# Patient Record
Sex: MALE | Race: WHITE | NOT HISPANIC OR LATINO | Employment: OTHER | URBAN - METROPOLITAN AREA
[De-identification: names, ages, dates, MRNs, and addresses within clinical notes are randomized per-mention and may not be internally consistent; named-entity substitution may affect disease eponyms.]

---

## 2017-03-30 ENCOUNTER — HISTORICAL (OUTPATIENT)
Dept: LAB | Facility: HOSPITAL | Age: 77
End: 2017-03-30

## 2017-10-16 ENCOUNTER — HISTORICAL (OUTPATIENT)
Dept: LAB | Facility: HOSPITAL | Age: 77
End: 2017-10-16

## 2017-10-16 LAB
ABS NEUT (OLG): 6.1 X10(3)/MCL (ref 2.1–9.2)
ALBUMIN SERPL-MCNC: 3.9 GM/DL (ref 3.4–5)
ALBUMIN/GLOB SERPL: 1.3 RATIO (ref 1.1–2)
ALP SERPL-CCNC: 80 UNIT/L (ref 46–116)
ALT SERPL-CCNC: 14 UNIT/L (ref 12–78)
APPEARANCE, UA: ABNORMAL
AST SERPL-CCNC: 19 UNIT/L (ref 15–37)
BACTERIA SPEC CULT: ABNORMAL
BASOPHILS # BLD AUTO: 0 X10(3)/MCL
BASOPHILS NFR BLD AUTO: 1 % (ref 0–2)
BILIRUB SERPL-MCNC: 0.4 MG/DL (ref 0.2–1)
BILIRUB UR QL STRIP: NEGATIVE
BILIRUBIN DIRECT+TOT PNL SERPL-MCNC: 0.16 MG/DL (ref 0–0.8)
BILIRUBIN DIRECT+TOT PNL SERPL-MCNC: 0.24 MG/DL (ref 0–0.2)
BUN SERPL-MCNC: 20.5 MG/DL (ref 7–18)
CALCIUM SERPL-MCNC: 9 MG/DL (ref 8.5–10.1)
CHLORIDE SERPL-SCNC: 105 MMOL/L (ref 98–107)
CHOLEST SERPL-MCNC: 125 MG/DL (ref 0–200)
CHOLEST/HDLC SERPL: 3.2 {RATIO} (ref 0–5)
CO2 SERPL-SCNC: 31.3 MMOL/L (ref 21–32)
COLOR UR: YELLOW
CREAT SERPL-MCNC: 1.17 MG/DL (ref 0.6–1.3)
EOSINOPHIL # BLD AUTO: 0.1 X10(3)/MCL
EOSINOPHIL NFR BLD AUTO: 2 %
ERYTHROCYTE [DISTWIDTH] IN BLOOD BY AUTOMATED COUNT: 13.6 % (ref 11.5–17)
GLOBULIN SER-MCNC: 3 GM/DL (ref 2.4–3.5)
GLUCOSE (UA): NEGATIVE
GLUCOSE SERPL-MCNC: 100 MG/DL (ref 74–106)
HCT VFR BLD AUTO: 42 % (ref 42–52)
HDLC SERPL-MCNC: 39 MG/DL (ref 40–60)
HGB BLD-MCNC: 14.2 GM/DL (ref 14–18)
HGB UR QL STRIP: ABNORMAL
KETONES UR QL STRIP: NEGATIVE
LDLC SERPL CALC-MCNC: 65 MG/DL (ref 0–129)
LEUKOCYTE ESTERASE UR QL STRIP: NEGATIVE
LYMPHOCYTES # BLD AUTO: 1.1 X10(3)/MCL
LYMPHOCYTES NFR BLD AUTO: 14 % (ref 13–40)
MCH RBC QN AUTO: 30.4 PG (ref 27–31)
MCHC RBC AUTO-ENTMCNC: 33.9 GM/DL (ref 33–36)
MCV RBC AUTO: 89.6 FL (ref 80–94)
MONOCYTES # BLD AUTO: 0.6 X10(3)/MCL
MONOCYTES NFR BLD AUTO: 7 % (ref 2–11)
NEUTROPHILS # BLD AUTO: 6.1 X10(3)/MCL (ref 2.1–9.2)
NEUTROPHILS NFR BLD AUTO: 76 % (ref 47–80)
NITRITE UR QL STRIP: NEGATIVE
PH UR STRIP: 5 [PH] (ref 5–9)
PLATELET # BLD AUTO: 181 X10(3)/MCL (ref 130–400)
PMV BLD AUTO: 7.9 FL (ref 7.4–10.4)
POTASSIUM SERPL-SCNC: 4.3 MMOL/L (ref 3.5–5.1)
PROT SERPL-MCNC: 6.9 GM/DL (ref 6.4–8.2)
PROT UR QL STRIP: NEGATIVE
RBC # BLD AUTO: 4.69 X10(6)/MCL (ref 4.7–6.1)
RBC #/AREA URNS HPF: ABNORMAL /[HPF]
SODIUM SERPL-SCNC: 144 MMOL/L (ref 136–145)
SP GR UR STRIP: 1.02 (ref 1–1.03)
SQUAMOUS EPITHELIAL, UA: ABNORMAL
TRIGL SERPL-MCNC: 105 MG/DL
UROBILINOGEN UR STRIP-ACNC: 0.2
VLDLC SERPL CALC-MCNC: 21 MG/DL
WBC # SPEC AUTO: 8 X10(3)/MCL (ref 4.5–11.5)
WBC #/AREA URNS HPF: ABNORMAL /HPF

## 2017-10-20 ENCOUNTER — HISTORICAL (OUTPATIENT)
Dept: LAB | Facility: HOSPITAL | Age: 77
End: 2017-10-20

## 2017-10-22 LAB — FINAL CULTURE: NO GROWTH

## 2018-02-23 ENCOUNTER — HISTORICAL (OUTPATIENT)
Dept: ADMINISTRATIVE | Facility: HOSPITAL | Age: 78
End: 2018-02-23

## 2018-04-09 ENCOUNTER — HISTORICAL (OUTPATIENT)
Dept: LAB | Facility: HOSPITAL | Age: 78
End: 2018-04-09

## 2018-04-09 LAB
ABS NEUT (OLG): 6.4 X10(3)/MCL (ref 2.1–9.2)
BASOPHILS # BLD AUTO: 0.1 X10(3)/MCL
BASOPHILS NFR BLD AUTO: 1 % (ref 0–2)
BUN SERPL-MCNC: 17.1 MG/DL (ref 7–18)
CALCIUM SERPL-MCNC: 9.2 MG/DL (ref 8.5–10.1)
CHLORIDE SERPL-SCNC: 104 MMOL/L (ref 98–107)
CO2 SERPL-SCNC: 32.9 MMOL/L (ref 21–32)
CREAT SERPL-MCNC: 0.98 MG/DL (ref 0.6–1.3)
CREAT/UREA NIT SERPL: 17
EOSINOPHIL # BLD AUTO: 0.1 X10(3)/MCL
EOSINOPHIL NFR BLD AUTO: 2 %
ERYTHROCYTE [DISTWIDTH] IN BLOOD BY AUTOMATED COUNT: 13.5 % (ref 11.5–17)
GLUCOSE SERPL-MCNC: 97 MG/DL (ref 74–106)
HCT VFR BLD AUTO: 42.3 % (ref 42–52)
HGB BLD-MCNC: 14.3 GM/DL (ref 14–18)
LYMPHOCYTES # BLD AUTO: 1.1 X10(3)/MCL
LYMPHOCYTES NFR BLD AUTO: 13 % (ref 13–40)
MCH RBC QN AUTO: 29.7 PG (ref 27–31)
MCHC RBC AUTO-ENTMCNC: 33.7 GM/DL (ref 33–36)
MCV RBC AUTO: 88.2 FL (ref 80–94)
MONOCYTES # BLD AUTO: 0.5 X10(3)/MCL
MONOCYTES NFR BLD AUTO: 6 % (ref 2–11)
NEUTROPHILS # BLD AUTO: 6.4 X10(3)/MCL (ref 2.1–9.2)
NEUTROPHILS NFR BLD AUTO: 78 % (ref 47–80)
PLATELET # BLD AUTO: 179 X10(3)/MCL (ref 130–400)
PMV BLD AUTO: 8.4 FL (ref 7.4–10.4)
POTASSIUM SERPL-SCNC: 4.7 MMOL/L (ref 3.5–5.1)
PSA SERPL-MCNC: 0.44 NG/ML (ref 0–4)
RBC # BLD AUTO: 4.8 X10(6)/MCL (ref 4.7–6.1)
SODIUM SERPL-SCNC: 144 MMOL/L (ref 136–145)
WBC # SPEC AUTO: 8.1 X10(3)/MCL (ref 4.5–11.5)

## 2018-10-09 ENCOUNTER — HISTORICAL (OUTPATIENT)
Dept: LAB | Facility: HOSPITAL | Age: 78
End: 2018-10-09

## 2018-10-09 LAB
ALBUMIN SERPL-MCNC: 3.9 GM/DL (ref 3.4–5)
ALBUMIN/GLOB SERPL: 1.3 RATIO (ref 1.1–2)
ALP SERPL-CCNC: 92 UNIT/L (ref 46–116)
ALT SERPL-CCNC: 14 UNIT/L (ref 12–78)
AST SERPL-CCNC: 18 UNIT/L (ref 15–37)
BILIRUB SERPL-MCNC: 0.9 MG/DL (ref 0.2–1)
BILIRUBIN DIRECT+TOT PNL SERPL-MCNC: 0.23 MG/DL (ref 0–0.2)
BILIRUBIN DIRECT+TOT PNL SERPL-MCNC: 0.67 MG/DL (ref 0–0.8)
BUN SERPL-MCNC: 22 MG/DL (ref 7–18)
CALCIUM SERPL-MCNC: 9.2 MG/DL (ref 8.5–10.1)
CHLORIDE SERPL-SCNC: 101 MMOL/L (ref 98–107)
CHOLEST SERPL-MCNC: 123 MG/DL (ref 0–200)
CHOLEST/HDLC SERPL: 3.6 {RATIO} (ref 0–5)
CO2 SERPL-SCNC: 32.5 MMOL/L (ref 21–32)
CREAT SERPL-MCNC: 1.29 MG/DL (ref 0.6–1.3)
GLOBULIN SER-MCNC: 3.1 GM/DL (ref 2.4–3.5)
GLUCOSE SERPL-MCNC: 100 MG/DL (ref 74–106)
HDLC SERPL-MCNC: 34 MG/DL (ref 40–60)
LDLC SERPL CALC-MCNC: 61 MG/DL (ref 0–129)
POTASSIUM SERPL-SCNC: 4.1 MMOL/L (ref 3.5–5.1)
PROT SERPL-MCNC: 7 GM/DL (ref 6.4–8.2)
PSA SERPL-MCNC: 0.5 NG/ML (ref 0–4)
SODIUM SERPL-SCNC: 144 MMOL/L (ref 136–145)
TRIGL SERPL-MCNC: 141 MG/DL
VLDLC SERPL CALC-MCNC: 28 MG/DL

## 2019-01-14 ENCOUNTER — HISTORICAL (OUTPATIENT)
Dept: LAB | Facility: HOSPITAL | Age: 79
End: 2019-01-14

## 2019-01-14 LAB
BUN SERPL-MCNC: 11.9 MG/DL (ref 7–18)
CALCIUM SERPL-MCNC: 9.3 MG/DL (ref 8.5–10.1)
CHLORIDE SERPL-SCNC: 103 MMOL/L (ref 98–107)
CO2 SERPL-SCNC: 33.2 MMOL/L (ref 21–32)
CREAT SERPL-MCNC: 1.1 MG/DL (ref 0.6–1.3)
CREAT/UREA NIT SERPL: 11
GLUCOSE SERPL-MCNC: 91 MG/DL (ref 74–106)
POTASSIUM SERPL-SCNC: 4.3 MMOL/L (ref 3.5–5.1)
SODIUM SERPL-SCNC: 143 MMOL/L (ref 136–145)

## 2019-02-26 ENCOUNTER — HISTORICAL (OUTPATIENT)
Dept: ADMINISTRATIVE | Facility: HOSPITAL | Age: 79
End: 2019-02-26

## 2019-04-15 ENCOUNTER — HISTORICAL (OUTPATIENT)
Dept: LAB | Facility: HOSPITAL | Age: 79
End: 2019-04-15

## 2019-04-15 LAB
ABS NEUT (OLG): 5.34 X10(3)/MCL (ref 2.1–9.2)
ALBUMIN SERPL-MCNC: 3.6 GM/DL (ref 3.4–5)
ALBUMIN/GLOB SERPL: 1.2 RATIO (ref 1.1–2)
ALP SERPL-CCNC: 84 UNIT/L (ref 46–116)
ALT SERPL-CCNC: 17 UNIT/L (ref 12–78)
AST SERPL-CCNC: 18 UNIT/L (ref 15–37)
BASOPHILS # BLD AUTO: 0 X10(3)/MCL (ref 0–0.2)
BASOPHILS NFR BLD AUTO: 0 %
BILIRUB SERPL-MCNC: 0.6 MG/DL (ref 0.2–1)
BILIRUBIN DIRECT+TOT PNL SERPL-MCNC: 0.15 MG/DL (ref 0–0.2)
BILIRUBIN DIRECT+TOT PNL SERPL-MCNC: 0.45 MG/DL (ref 0–0.8)
BUN SERPL-MCNC: 15.9 MG/DL (ref 7–18)
CALCIUM SERPL-MCNC: 9 MG/DL (ref 8.5–10.1)
CHLORIDE SERPL-SCNC: 101 MMOL/L (ref 98–107)
CO2 SERPL-SCNC: 31.1 MMOL/L (ref 21–32)
CREAT SERPL-MCNC: 1 MG/DL (ref 0.6–1.3)
EOSINOPHIL # BLD AUTO: 0.2 X10(3)/MCL (ref 0–0.9)
EOSINOPHIL NFR BLD AUTO: 2 %
ERYTHROCYTE [DISTWIDTH] IN BLOOD BY AUTOMATED COUNT: 12.9 % (ref 11.5–17)
GLOBULIN SER-MCNC: 3 GM/DL (ref 2.4–3.5)
GLUCOSE SERPL-MCNC: 84 MG/DL (ref 74–106)
HCT VFR BLD AUTO: 43 % (ref 42–52)
HGB BLD-MCNC: 14.5 GM/DL (ref 14–18)
IMM GRANULOCYTES # BLD AUTO: 0.03 % (ref 0–0.02)
IMM GRANULOCYTES NFR BLD AUTO: 0.4 % (ref 0–0.43)
LYMPHOCYTES # BLD AUTO: 1.1 X10(3)/MCL (ref 0.6–4.6)
LYMPHOCYTES NFR BLD AUTO: 15 %
MCH RBC QN AUTO: 29.7 PG (ref 27–31)
MCHC RBC AUTO-ENTMCNC: 33.7 GM/DL (ref 33–36)
MCV RBC AUTO: 87.9 FL (ref 80–94)
MONOCYTES # BLD AUTO: 0.6 X10(3)/MCL (ref 0.1–1.3)
MONOCYTES NFR BLD AUTO: 8 %
NEUTROPHILS # BLD AUTO: 5.34 X10(3)/MCL (ref 1.4–7.9)
NEUTROPHILS NFR BLD AUTO: 73 %
PLATELET # BLD AUTO: 194 X10(3)/MCL (ref 130–400)
PMV BLD AUTO: 10.1 FL (ref 9.4–12.4)
POTASSIUM SERPL-SCNC: 3.8 MMOL/L (ref 3.5–5.1)
PROT SERPL-MCNC: 6.6 GM/DL (ref 6.4–8.2)
RBC # BLD AUTO: 4.89 X10(6)/MCL (ref 4.7–6.1)
SODIUM SERPL-SCNC: 140 MMOL/L (ref 136–145)
WBC # SPEC AUTO: 7.3 X10(3)/MCL (ref 4.5–11.5)

## 2019-06-25 ENCOUNTER — HISTORICAL (OUTPATIENT)
Dept: ADMINISTRATIVE | Facility: HOSPITAL | Age: 79
End: 2019-06-25

## 2019-10-30 ENCOUNTER — HISTORICAL (OUTPATIENT)
Dept: LAB | Facility: HOSPITAL | Age: 79
End: 2019-10-30

## 2019-10-30 LAB
ALBUMIN SERPL-MCNC: 3.7 GM/DL (ref 3.4–5)
ALBUMIN/GLOB SERPL: 1.2 RATIO (ref 1.1–2)
ALP SERPL-CCNC: 97 UNIT/L (ref 46–116)
ALT SERPL-CCNC: 17 UNIT/L (ref 12–78)
AST SERPL-CCNC: 20 UNIT/L (ref 15–37)
BILIRUB SERPL-MCNC: 0.6 MG/DL (ref 0.2–1)
BILIRUBIN DIRECT+TOT PNL SERPL-MCNC: 0.19 MG/DL (ref 0–0.2)
BILIRUBIN DIRECT+TOT PNL SERPL-MCNC: 0.41 MG/DL (ref 0–0.8)
BUN SERPL-MCNC: 12.5 MG/DL (ref 7–18)
CALCIUM SERPL-MCNC: 8.8 MG/DL (ref 8.5–10.1)
CHLORIDE SERPL-SCNC: 105 MMOL/L (ref 98–107)
CHOLEST SERPL-MCNC: 134 MG/DL (ref 0–200)
CHOLEST/HDLC SERPL: 3.8 {RATIO} (ref 0–5)
CO2 SERPL-SCNC: 34.9 MMOL/L (ref 21–32)
CREAT SERPL-MCNC: 0.92 MG/DL (ref 0.6–1.3)
EST. AVERAGE GLUCOSE BLD GHB EST-MCNC: 108 MG/DL
GLOBULIN SER-MCNC: 3.1 GM/DL (ref 2.4–3.5)
GLUCOSE SERPL-MCNC: 89 MG/DL (ref 74–106)
HBA1C MFR BLD: 5.4 % (ref 4.5–6.2)
HDLC SERPL-MCNC: 35 MG/DL (ref 40–60)
LDLC SERPL CALC-MCNC: 75 MG/DL (ref 0–129)
POTASSIUM SERPL-SCNC: 4.1 MMOL/L (ref 3.5–5.1)
PROT SERPL-MCNC: 6.8 GM/DL (ref 6.4–8.2)
SODIUM SERPL-SCNC: 145 MMOL/L (ref 136–145)
TRIGL SERPL-MCNC: 118 MG/DL
VLDLC SERPL CALC-MCNC: 24 MG/DL

## 2020-04-29 ENCOUNTER — HISTORICAL (OUTPATIENT)
Dept: LAB | Facility: HOSPITAL | Age: 80
End: 2020-04-29

## 2020-04-29 LAB
ABS NEUT (OLG): 7.44 X10(3)/MCL (ref 2.1–9.2)
BASOPHILS # BLD AUTO: 0 X10(3)/MCL (ref 0–0.2)
BASOPHILS NFR BLD AUTO: 0 %
BUN SERPL-MCNC: 14.1 MG/DL (ref 7–18)
CALCIUM SERPL-MCNC: 8.9 MG/DL (ref 8.5–10.1)
CHLORIDE SERPL-SCNC: 105 MMOL/L (ref 98–107)
CO2 SERPL-SCNC: 30.1 MMOL/L (ref 21–32)
CREAT SERPL-MCNC: 0.79 MG/DL (ref 0.6–1.3)
CREAT/UREA NIT SERPL: 18
EOSINOPHIL # BLD AUTO: 0.1 X10(3)/MCL (ref 0–0.9)
EOSINOPHIL NFR BLD AUTO: 1 %
ERYTHROCYTE [DISTWIDTH] IN BLOOD BY AUTOMATED COUNT: 12.5 % (ref 11.5–17)
GLUCOSE SERPL-MCNC: 105 MG/DL (ref 74–106)
HCT VFR BLD AUTO: 44.3 % (ref 42–52)
HGB BLD-MCNC: 14.5 GM/DL (ref 14–18)
IMM GRANULOCYTES # BLD AUTO: 0.01 % (ref 0–0.02)
IMM GRANULOCYTES NFR BLD AUTO: 0.1 % (ref 0–0.43)
LYMPHOCYTES # BLD AUTO: 1 X10(3)/MCL (ref 0.6–4.6)
LYMPHOCYTES NFR BLD AUTO: 11 %
MCH RBC QN AUTO: 29.8 PG (ref 27–31)
MCHC RBC AUTO-ENTMCNC: 32.7 GM/DL (ref 33–36)
MCV RBC AUTO: 91.2 FL (ref 80–94)
MONOCYTES # BLD AUTO: 0.6 X10(3)/MCL (ref 0.1–1.3)
MONOCYTES NFR BLD AUTO: 7 %
NEUTROPHILS # BLD AUTO: 7.44 X10(3)/MCL (ref 1.4–7.9)
NEUTROPHILS NFR BLD AUTO: 82 %
PLATELET # BLD AUTO: 176 X10(3)/MCL (ref 130–400)
PMV BLD AUTO: 10.5 FL (ref 9.4–12.4)
POTASSIUM SERPL-SCNC: 3.9 MMOL/L (ref 3.5–5.1)
PSA SERPL-MCNC: 0.38 NG/ML (ref 0–4)
RBC # BLD AUTO: 4.86 X10(6)/MCL (ref 4.7–6.1)
SODIUM SERPL-SCNC: 143 MMOL/L (ref 136–145)
WBC # SPEC AUTO: 9.1 X10(3)/MCL (ref 4.5–11.5)

## 2020-12-14 ENCOUNTER — HISTORICAL (OUTPATIENT)
Dept: LAB | Facility: HOSPITAL | Age: 80
End: 2020-12-14

## 2020-12-14 LAB
ALBUMIN SERPL-MCNC: 4.1 GM/DL (ref 3.4–4.8)
ALBUMIN/GLOB SERPL: 1.3 RATIO (ref 1.1–2)
ALP SERPL-CCNC: 96 UNIT/L (ref 40–150)
ALT SERPL-CCNC: 12 UNIT/L (ref 0–55)
AST SERPL-CCNC: 20 UNIT/L (ref 5–34)
BILIRUB SERPL-MCNC: 1.3 MG/DL
BILIRUBIN DIRECT+TOT PNL SERPL-MCNC: 0.5 MG/DL (ref 0–0.5)
BILIRUBIN DIRECT+TOT PNL SERPL-MCNC: 0.8 MG/DL (ref 0–0.8)
BUN SERPL-MCNC: 12.9 MG/DL (ref 8.4–25.7)
CALCIUM SERPL-MCNC: 9.4 MG/DL (ref 8.8–10)
CHLORIDE SERPL-SCNC: 104 MMOL/L (ref 98–107)
CHOLEST SERPL-MCNC: 119 MG/DL
CHOLEST/HDLC SERPL: 3 {RATIO} (ref 0–5)
CO2 SERPL-SCNC: 30 MMOL/L (ref 23–31)
CREAT SERPL-MCNC: 0.74 MG/DL (ref 0.73–1.18)
GLOBULIN SER-MCNC: 3.1 GM/DL (ref 2.4–3.5)
GLUCOSE SERPL-MCNC: 98 MG/DL (ref 82–115)
HDLC SERPL-MCNC: 42 MG/DL (ref 35–60)
LDLC SERPL CALC-MCNC: 58 MG/DL (ref 50–140)
POTASSIUM SERPL-SCNC: 4.8 MMOL/L (ref 3.5–5.1)
PROT SERPL-MCNC: 7.2 GM/DL (ref 5.8–7.6)
SODIUM SERPL-SCNC: 143 MMOL/L (ref 136–145)
TRIGL SERPL-MCNC: 97 MG/DL (ref 34–140)
VLDLC SERPL CALC-MCNC: 19 MG/DL

## 2020-12-22 ENCOUNTER — HISTORICAL (OUTPATIENT)
Dept: ADMINISTRATIVE | Facility: HOSPITAL | Age: 80
End: 2020-12-22

## 2021-05-06 ENCOUNTER — HISTORICAL (OUTPATIENT)
Dept: LAB | Facility: HOSPITAL | Age: 81
End: 2021-05-06

## 2021-05-06 LAB
ABS NEUT (OLG): 5.82 X10(3)/MCL (ref 2.1–9.2)
BASOPHILS # BLD AUTO: 0 X10(3)/MCL (ref 0–0.2)
BASOPHILS NFR BLD AUTO: 0 %
BUN SERPL-MCNC: 11.9 MG/DL (ref 8.4–25.7)
CALCIUM SERPL-MCNC: 8.8 MG/DL (ref 8.8–10)
CHLORIDE SERPL-SCNC: 104 MMOL/L (ref 98–107)
CO2 SERPL-SCNC: 30 MMOL/L (ref 23–31)
CREAT SERPL-MCNC: 0.79 MG/DL (ref 0.73–1.18)
CREAT/UREA NIT SERPL: 15
CRP SERPL-MCNC: 0 MG/DL (ref 0–1)
D DIMER PPP IA.FEU-MCNC: 0.21 MG/L (ref 0.19–0.5)
EOSINOPHIL # BLD AUTO: 0.1 X10(3)/MCL (ref 0–0.9)
EOSINOPHIL NFR BLD AUTO: 1 %
ERYTHROCYTE [DISTWIDTH] IN BLOOD BY AUTOMATED COUNT: 12.5 % (ref 11.5–17)
GLUCOSE SERPL-MCNC: 100 MG/DL (ref 82–115)
HCT VFR BLD AUTO: 45.1 % (ref 42–52)
HGB BLD-MCNC: 14.5 GM/DL (ref 14–18)
IMM GRANULOCYTES # BLD AUTO: 0.02 % (ref 0–0.02)
IMM GRANULOCYTES NFR BLD AUTO: 0.3 % (ref 0–0.43)
LYMPHOCYTES # BLD AUTO: 1 X10(3)/MCL (ref 0.6–4.6)
LYMPHOCYTES NFR BLD AUTO: 13 %
MCH RBC QN AUTO: 29.7 PG (ref 27–31)
MCHC RBC AUTO-ENTMCNC: 32.2 GM/DL (ref 33–36)
MCV RBC AUTO: 92.2 FL (ref 80–94)
MONOCYTES # BLD AUTO: 0.6 X10(3)/MCL (ref 0.1–1.3)
MONOCYTES NFR BLD AUTO: 8 %
NEUTROPHILS # BLD AUTO: 5.82 X10(3)/MCL (ref 1.4–7.9)
NEUTROPHILS NFR BLD AUTO: 77 %
PLATELET # BLD AUTO: 166 X10(3)/MCL (ref 130–400)
PMV BLD AUTO: 9.7 FL (ref 9.4–12.4)
POTASSIUM SERPL-SCNC: 4.1 MMOL/L (ref 3.5–5.1)
RBC # BLD AUTO: 4.89 X10(6)/MCL (ref 4.7–6.1)
SODIUM SERPL-SCNC: 140 MMOL/L (ref 136–145)
WBC # SPEC AUTO: 7.6 X10(3)/MCL (ref 4.5–11.5)

## 2021-06-08 ENCOUNTER — HISTORICAL (OUTPATIENT)
Dept: RADIOLOGY | Facility: HOSPITAL | Age: 81
End: 2021-06-08

## 2021-11-09 ENCOUNTER — HISTORICAL (OUTPATIENT)
Dept: LAB | Facility: HOSPITAL | Age: 81
End: 2021-11-09

## 2021-11-09 LAB
ABS NEUT (OLG): 6.21 X10(3)/MCL (ref 2.1–9.2)
ALBUMIN SERPL-MCNC: 4.2 GM/DL (ref 3.4–4.8)
ALBUMIN/GLOB SERPL: 1.4 RATIO (ref 1.1–2)
ALP SERPL-CCNC: 91 UNIT/L (ref 40–150)
ALT SERPL-CCNC: 11 UNIT/L (ref 0–55)
APPEARANCE, UA: CLEAR
AST SERPL-CCNC: 19 UNIT/L (ref 5–34)
BACTERIA SPEC CULT: NORMAL
BASOPHILS # BLD AUTO: 0 X10(3)/MCL (ref 0–0.2)
BASOPHILS NFR BLD AUTO: 0 %
BILIRUB SERPL-MCNC: 1.3 MG/DL
BILIRUB UR QL STRIP: NEGATIVE
BILIRUBIN DIRECT+TOT PNL SERPL-MCNC: 0.4 MG/DL (ref 0–0.5)
BILIRUBIN DIRECT+TOT PNL SERPL-MCNC: 0.9 MG/DL (ref 0–0.8)
BUN SERPL-MCNC: 14.2 MG/DL (ref 8.4–25.7)
CALCIUM SERPL-MCNC: 9.4 MG/DL (ref 8.7–10.5)
CHLORIDE SERPL-SCNC: 102 MMOL/L (ref 98–107)
CHOLEST SERPL-MCNC: 141 MG/DL
CHOLEST/HDLC SERPL: 4 {RATIO} (ref 0–5)
CO2 SERPL-SCNC: 30 MMOL/L (ref 23–31)
COLOR UR: YELLOW
CREAT SERPL-MCNC: 0.84 MG/DL (ref 0.73–1.18)
CRP SERPL-MCNC: 0 MG/DL (ref 0–1)
EOSINOPHIL # BLD AUTO: 0.2 X10(3)/MCL (ref 0–0.9)
EOSINOPHIL NFR BLD AUTO: 2 %
ERYTHROCYTE [DISTWIDTH] IN BLOOD BY AUTOMATED COUNT: 12.4 % (ref 11.5–17)
EST. AVERAGE GLUCOSE BLD GHB EST-MCNC: 105.4 MG/DL
GLOBULIN SER-MCNC: 2.9 GM/DL (ref 2.4–3.5)
GLUCOSE (UA): NEGATIVE
GLUCOSE SERPL-MCNC: 101 MG/DL (ref 82–115)
HBA1C MFR BLD: 5.3 %
HCT VFR BLD AUTO: 50.6 % (ref 42–52)
HDLC SERPL-MCNC: 38 MG/DL (ref 35–60)
HGB BLD-MCNC: 16.1 GM/DL (ref 14–18)
HGB UR QL STRIP: NORMAL
IMM GRANULOCYTES # BLD AUTO: 0.04 % (ref 0–0.02)
IMM GRANULOCYTES NFR BLD AUTO: 0.5 % (ref 0–0.43)
KETONES UR QL STRIP: NEGATIVE
LDLC SERPL CALC-MCNC: 75 MG/DL (ref 50–140)
LEUKOCYTE ESTERASE UR QL STRIP: NEGATIVE
LYMPHOCYTES # BLD AUTO: 1.3 X10(3)/MCL (ref 0.6–4.6)
LYMPHOCYTES NFR BLD AUTO: 16 %
MCH RBC QN AUTO: 29.9 PG (ref 27–31)
MCHC RBC AUTO-ENTMCNC: 31.8 GM/DL (ref 33–36)
MCV RBC AUTO: 93.9 FL (ref 80–94)
MONOCYTES # BLD AUTO: 0.6 X10(3)/MCL (ref 0.1–1.3)
MONOCYTES NFR BLD AUTO: 7 %
NEUTROPHILS # BLD AUTO: 6.21 X10(3)/MCL (ref 1.4–7.9)
NEUTROPHILS NFR BLD AUTO: 75 %
NITRITE UR QL STRIP: NEGATIVE
PH UR STRIP: 7.5 [PH] (ref 5–9)
PLATELET # BLD AUTO: 186 X10(3)/MCL (ref 130–400)
PMV BLD AUTO: 10.1 FL (ref 9.4–12.4)
POTASSIUM SERPL-SCNC: 4.2 MMOL/L (ref 3.5–5.1)
PROT SERPL-MCNC: 7.1 GM/DL (ref 5.8–7.6)
PROT UR QL STRIP: 30
RBC # BLD AUTO: 5.39 X10(6)/MCL (ref 4.7–6.1)
RBC #/AREA URNS HPF: NORMAL /HPF
SODIUM SERPL-SCNC: 142 MMOL/L (ref 136–145)
SP GR UR STRIP: 1.02 (ref 1–1.03)
SQUAMOUS EPITHELIAL, UA: NORMAL
TRIGL SERPL-MCNC: 142 MG/DL (ref 34–140)
TSH SERPL-ACNC: 2.29 UIU/ML (ref 0.35–4.94)
URATE SERPL-MCNC: 6.5 MG/DL (ref 3.5–7.2)
UROBILINOGEN UR STRIP-ACNC: 0.2
VLDLC SERPL CALC-MCNC: 28 MG/DL
WBC # SPEC AUTO: 8.3 X10(3)/MCL (ref 4.5–11.5)
WBC #/AREA URNS HPF: NORMAL /[HPF]

## 2021-11-24 ENCOUNTER — HISTORICAL (OUTPATIENT)
Dept: LAB | Facility: HOSPITAL | Age: 81
End: 2021-11-24

## 2021-11-24 LAB
BUN SERPL-MCNC: 10.5 MG/DL (ref 8.4–25.7)
CALCIUM SERPL-MCNC: 9.5 MG/DL (ref 8.7–10.5)
CHLORIDE SERPL-SCNC: 104 MMOL/L (ref 98–107)
CO2 SERPL-SCNC: 29 MMOL/L (ref 23–31)
CREAT SERPL-MCNC: 0.84 MG/DL (ref 0.73–1.18)
CREAT/UREA NIT SERPL: 12
GLUCOSE SERPL-MCNC: 102 MG/DL (ref 82–115)
POTASSIUM SERPL-SCNC: 3.6 MMOL/L (ref 3.5–5.1)
SODIUM SERPL-SCNC: 142 MMOL/L (ref 136–145)

## 2021-12-01 ENCOUNTER — HISTORICAL (OUTPATIENT)
Dept: RADIOLOGY | Facility: HOSPITAL | Age: 81
End: 2021-12-01

## 2021-12-06 ENCOUNTER — HISTORICAL (OUTPATIENT)
Dept: LAB | Facility: HOSPITAL | Age: 81
End: 2021-12-06

## 2021-12-06 LAB
BUN SERPL-MCNC: 13 MG/DL (ref 8.4–25.7)
CALCIUM SERPL-MCNC: 9 MG/DL (ref 8.7–10.5)
CHLORIDE SERPL-SCNC: 102 MMOL/L (ref 98–107)
CO2 SERPL-SCNC: 31 MMOL/L (ref 23–31)
CREAT SERPL-MCNC: 0.97 MG/DL (ref 0.73–1.18)
CREAT/UREA NIT SERPL: 13
GLUCOSE SERPL-MCNC: 134 MG/DL (ref 82–115)
POTASSIUM SERPL-SCNC: 4.5 MMOL/L (ref 3.5–5.1)
SODIUM SERPL-SCNC: 142 MMOL/L (ref 136–145)

## 2022-03-17 ENCOUNTER — HISTORICAL (OUTPATIENT)
Dept: ADMINISTRATIVE | Facility: HOSPITAL | Age: 82
End: 2022-03-17

## 2022-03-17 ENCOUNTER — HOSPITAL ENCOUNTER (OUTPATIENT)
Dept: MEDSURG UNIT | Facility: HOSPITAL | Age: 82
End: 2022-03-19
Attending: INTERNAL MEDICINE | Admitting: INTERNAL MEDICINE

## 2022-03-18 LAB
CHOLEST SERPL-MCNC: 143 MG/DL
CHOLEST/HDLC SERPL: 4 {RATIO} (ref 0–5)
HDLC SERPL-MCNC: 35 MG/DL (ref 35–60)
LDLC SERPL CALC-MCNC: 79 MG/DL (ref 50–140)
TRIGL SERPL-MCNC: 146 MG/DL (ref 34–140)
VLDLC SERPL CALC-MCNC: 29 MG/DL

## 2022-04-10 ENCOUNTER — HISTORICAL (OUTPATIENT)
Dept: ADMINISTRATIVE | Facility: HOSPITAL | Age: 82
End: 2022-04-10
Payer: MEDICARE

## 2022-04-27 VITALS
OXYGEN SATURATION: 97 % | SYSTOLIC BLOOD PRESSURE: 159 MMHG | BODY MASS INDEX: 35.16 KG/M2 | DIASTOLIC BLOOD PRESSURE: 64 MMHG | HEIGHT: 67 IN | WEIGHT: 224 LBS

## 2022-04-30 NOTE — OP NOTE
DATE OF SURGERY:   2-26-19    SURGEON:  Estelita Cast MD    PREOPERATIVE DIAGNOSIS:  Primary open-angle glaucoma of the right eye.  Visually significant cataract of the right eye.    POSTOPERATIVE DIAGNOSIS:  Primary open-angle glaucoma of the right eye.  Visually significant cataract of the right eye.  Status post procedure.    PROCEDURE PERFORMED:  XEN implantation of the right eye.  Complex Phacoemulsification with intraocular lens implantation of the right eye with the aid of iris hooks.    ANESTHESIA:  General IV.    COMPLICATIONS:  None.    ESTIMATED BLOOD LOSS:  Less than 1 cc.    INDICATION FOR PROCEDURE:  The patient was evaluated in clinic and noted to have increased eye pressure on maximum medical therapy as well as a visually significant cataract of the right eye.  Advantages, risks and benefits of surgical intervention were discussed with the patient including, but not limited to bleeding, infection, need for subsequent surgery.  The patient acknowledged understanding and wished to proceed.  Informed consent was obtained from the patient in the office.    PROCEDURE IN DETAIL:  The patient was brought into the operating room, laid in the supine position.  Anesthesia was undertaken without complication.  The operative eye and periorbital region were prepped and draped in the usual manner for intraocular surgery.  A wire lid speculum was placed in the operative eye for exposure of the surgical site.  A paracentesis was made temporally at 11 o'clock and a small amount of preservative free lidocaine and epinepheine was injected into the anterior chamber.  Iris hooks were inserted for mydriasis as the patient's pupil did not dilate with medication.  Viscoelastic was injected into the anterior chamber.  A triplanar cataract wound was made at 7 o'clock through clear cornea at the limbus.  A curvilinear capsulorrhexis was created using a cystotome and Utrata forceps.  The lens was hydrodissected using  BSS on a cannula.  The lens was removed in a divide and conquer technique.  The remaining cortical material was removed with the ENMANUEL handpiece.  Healon was injected to inflate the bag.  A 19.0 diopter ZCBOO lens was inserted into the capsular bag and set in good position.  The viscoelastic was removed as well as the iris hooks.  Miostat was used and Healon GV inserted into the anterior chamber.  XEN implant was inserted at 12 o'clock.  The implant was noted to be in good position underneath the conjunctiva.  A 0.4 MG/ML concentration of mitomycin C in The amount of 0.1 mL was injected subconjunctivally and a wet cotton tipped applicator was used to spread the medication subconjunctivally near the XEN implant.  The Healon was removed.  Postoperative injections given.  The patient tolerated the procedure in stable condition.

## 2022-05-09 ENCOUNTER — OFFICE VISIT (OUTPATIENT)
Dept: PRIMARY CARE CLINIC | Facility: CLINIC | Age: 82
End: 2022-05-09
Payer: MEDICARE

## 2022-05-09 VITALS
BODY MASS INDEX: 35.31 KG/M2 | DIASTOLIC BLOOD PRESSURE: 58 MMHG | HEIGHT: 67 IN | WEIGHT: 225 LBS | OXYGEN SATURATION: 94 % | RESPIRATION RATE: 16 BRPM | TEMPERATURE: 97 F | SYSTOLIC BLOOD PRESSURE: 121 MMHG | HEART RATE: 62 BPM

## 2022-05-09 DIAGNOSIS — C61 PROSTATE CANCER: ICD-10-CM

## 2022-05-09 DIAGNOSIS — I10 PRIMARY HYPERTENSION: Primary | ICD-10-CM

## 2022-05-09 DIAGNOSIS — I25.10 CORONARY ARTERY DISEASE INVOLVING NATIVE CORONARY ARTERY OF NATIVE HEART, UNSPECIFIED WHETHER ANGINA PRESENT: ICD-10-CM

## 2022-05-09 DIAGNOSIS — H40.9 GLAUCOMA, UNSPECIFIED GLAUCOMA TYPE, UNSPECIFIED LATERALITY: ICD-10-CM

## 2022-05-09 DIAGNOSIS — R54 FRAIL ELDERLY: ICD-10-CM

## 2022-05-09 DIAGNOSIS — R60.0 EDEMA LEG: ICD-10-CM

## 2022-05-09 PROBLEM — N40.0 BENIGN PROSTATIC HYPERPLASIA: Status: ACTIVE | Noted: 2022-05-09

## 2022-05-09 PROBLEM — H26.9 CATARACT: Status: ACTIVE | Noted: 2022-05-09

## 2022-05-09 PROBLEM — M19.90 OSTEOARTHRITIS: Status: ACTIVE | Noted: 2022-05-09

## 2022-05-09 PROBLEM — Z97.4 HEARING AID WORN: Status: ACTIVE | Noted: 2022-05-09

## 2022-05-09 PROBLEM — H91.90 HEARING LOSS: Status: ACTIVE | Noted: 2022-05-09

## 2022-05-09 PROBLEM — E66.9 OBESITY: Status: ACTIVE | Noted: 2022-05-09

## 2022-05-09 PROCEDURE — 99213 OFFICE O/P EST LOW 20 MIN: CPT | Mod: ,,, | Performed by: INTERNAL MEDICINE

## 2022-05-09 PROCEDURE — 99213 PR OFFICE/OUTPT VISIT, EST, LEVL III, 20-29 MIN: ICD-10-PCS | Mod: ,,, | Performed by: INTERNAL MEDICINE

## 2022-05-09 RX ORDER — LOSARTAN POTASSIUM 100 MG/1
100 TABLET ORAL DAILY
Qty: 90 TABLET | Refills: 1 | Status: SHIPPED | OUTPATIENT
Start: 2022-05-09 | End: 2022-12-30

## 2022-05-09 RX ORDER — NAPROXEN SODIUM 220 MG/1
81 TABLET, FILM COATED ORAL
COMMUNITY
Start: 2022-03-18 | End: 2022-05-09

## 2022-05-09 RX ORDER — CYCLOSPORINE 0.5 MG/ML
1 EMULSION OPHTHALMIC 2 TIMES DAILY
COMMUNITY
End: 2023-12-05 | Stop reason: ALTCHOICE

## 2022-05-09 RX ORDER — LOSARTAN POTASSIUM 50 MG/1
50 TABLET ORAL DAILY
COMMUNITY
Start: 2021-05-28 | End: 2022-05-09

## 2022-05-09 RX ORDER — DOXAZOSIN 2 MG/1
2 TABLET ORAL NIGHTLY
COMMUNITY
Start: 2022-03-22 | End: 2023-12-05 | Stop reason: ALTCHOICE

## 2022-05-09 RX ORDER — DORZOLAMIDE HCL 20 MG/ML
1 SOLUTION/ DROPS OPHTHALMIC 3 TIMES DAILY
COMMUNITY

## 2022-05-09 RX ORDER — POTASSIUM CHLORIDE 20 MEQ/1
20 TABLET, EXTENDED RELEASE ORAL 2 TIMES DAILY
COMMUNITY
Start: 2022-04-13

## 2022-05-09 RX ORDER — TIMOLOL MALEATE 5 MG/ML
1 SOLUTION/ DROPS OPHTHALMIC DAILY
COMMUNITY

## 2022-05-09 RX ORDER — PRASUGREL 10 MG/1
10 TABLET, FILM COATED ORAL DAILY
COMMUNITY
Start: 2022-04-19 | End: 2023-12-05 | Stop reason: ALTCHOICE

## 2022-05-09 RX ORDER — FINASTERIDE 5 MG/1
5 TABLET, FILM COATED ORAL DAILY
COMMUNITY
Start: 2021-05-06 | End: 2023-12-05 | Stop reason: ALTCHOICE

## 2022-05-09 RX ORDER — METOPROLOL TARTRATE 25 MG/1
25 TABLET, FILM COATED ORAL 2 TIMES DAILY
COMMUNITY
Start: 2022-04-19 | End: 2023-02-27

## 2022-05-09 RX ORDER — LOSARTAN POTASSIUM 100 MG/1
100 TABLET ORAL
COMMUNITY
End: 2022-05-09 | Stop reason: SDUPTHER

## 2022-05-09 RX ORDER — ASPIRIN 325 MG
1 TABLET, DELAYED RELEASE (ENTERIC COATED) ORAL DAILY
COMMUNITY
Start: 2022-04-20 | End: 2023-04-20

## 2022-05-09 RX ORDER — HYDRALAZINE HYDROCHLORIDE 50 MG/1
50 TABLET, FILM COATED ORAL 2 TIMES DAILY
COMMUNITY
Start: 2022-03-18

## 2022-05-09 RX ORDER — AMLODIPINE BESYLATE 10 MG/1
10 TABLET ORAL DAILY
COMMUNITY
Start: 2022-03-19 | End: 2023-08-30 | Stop reason: ALTCHOICE

## 2022-05-09 RX ORDER — ASCORBIC ACID 500 MG
500 TABLET ORAL
COMMUNITY
End: 2023-12-05

## 2022-05-09 RX ORDER — TAFLUPROST OPTHALMIC 0 MG/.3ML
1 SOLUTION/ DROPS OPHTHALMIC DAILY
COMMUNITY

## 2022-05-09 RX ORDER — ATORVASTATIN CALCIUM 80 MG/1
80 TABLET, FILM COATED ORAL
COMMUNITY
End: 2022-08-15

## 2022-05-09 NOTE — PROGRESS NOTES
Renuka Adams MD   [address] [place] [address]     PATIENT NAME: Marquis Quintero  : 1940  DATE: 22  MRN: [id number]      Billing Provider: Renuka Adams MD  Level of Service:   Patient PCP Information     Provider PCP Type    Renuka Adams MD General          Reason for Visit / Chief Complaint: 3 months follow up       Update PCP  Update Chief Complaint         History of Present Illness / Problem Focused Workflow     Marquis Quintero presents to the clinic with 3 months follow up     Here for follow up of HTN, CAD, Prostate Cancer he had a recent stent in his heart with Dr Lozoya. He notes his legs still swell. He is not as bad now as he will be later in the day. He can walk but certain actions bring on his SOB. Like getting in car or getting in bed. He goes tomorrow to see Dr Lozoya. He is doing more salt in diet, he's going to watch better. They told him to do full dose ASA now and he'll occasionally take it for pain too. He is not sure when he goes to see his urologist, it's not too far off. He started something but Devora took him off one. His BP has been really good lately. 120's.      Review of Systems     Review of Systems   Constitutional: Positive for activity change.   HENT: Negative.    Eyes: Positive for visual disturbance.        Still using drops things still look foggy at times   Respiratory: Positive for shortness of breath. Negative for cough.    Cardiovascular: Positive for leg swelling. Negative for chest pain.   Gastrointestinal:        Does have to take the stool softener. Belches a lot but no heartburn   Endocrine: Negative.    Genitourinary:        Prostate meds changed with cardiology. He is urinating ok to him. Dr Scott   Musculoskeletal: Positive for arthralgias.        Arthritis better, his arms were getting tired.    Skin: Positive for color change.        Skin gets red when legs swell   Allergic/Immunologic: Negative.    Neurological: Negative.    Hematological: Negative.     Psychiatric/Behavioral: Negative.        Medical / Social / Family History     Past Medical History:   Diagnosis Date    Aortic regurgitation     Carotid artery stenosis     HTN (hypertension)     Internal hemorrhage     Mitral regurgitation     Personal history of colonic polyps     Skin cancer     Unspecified glaucoma        Past Surgical History:   Procedure Laterality Date    COLONOSCOPY      CYSTOSCOPY      EXTRACTION OF CATARACT      PROSTATE BIOPSY         Social History  Mr. Quintero      reports that he has never smoked. He has never used smokeless tobacco. He reports that he does not drink alcohol and does not use drugs.    Family History  Mr.'s Quintero   family history includes Alzheimer's disease in his father; Colon cancer in his mother; Heart disease in his mother.    Medications and Allergies     Medications  Outpatient Medications Marked as Taking for the 5/9/22 encounter (Office Visit) with Renuka Adams MD   Medication Sig Dispense Refill    amLODIPine (NORVASC) 10 MG tablet Take 10 mg by mouth once daily.      ascorbic acid, vitamin C, (VITAMIN C) 500 MG tablet Take 500 mg by mouth.      aspirin (ECOTRIN) 325 MG EC tablet Take 1 tablet by mouth once daily.      atorvastatin (LIPITOR) 80 MG tablet Take 80 mg by mouth.      doxazosin (CARDURA) 2 MG tablet Take 2 mg by mouth every evening.      finasteride (PROSCAR) 5 mg tablet Take 5 mg by mouth once daily.      hydrALAZINE (APRESOLINE) 50 MG tablet Take 50 mg by mouth 3 (three) times daily.      potassium chloride SA (K-DUR,KLOR-CON) 20 MEQ tablet Take 20 mEq by mouth once daily.      prasugreL (EFFIENT) 10 mg Tab Take 10 mg by mouth once daily. For 30 days      [DISCONTINUED] losartan (COZAAR) 100 MG tablet Take 100 mg by mouth.         Allergies  Review of patient's allergies indicates:  No Known Allergies    Physical Examination     Vitals:    05/09/22 0908   BP: (!) 121/58   Pulse: 62   Resp: 16   Temp: 97.4 °F (36.3  °C)     Physical Exam  HENT:      Head: Normocephalic.      Ears:      Comments: Very [place]  Eyes:      Extraocular Movements: Extraocular movements intact.      Pupils: Pupils are equal, round, and reactive to light.   Cardiovascular:      Rate and Rhythm: Normal rate and regular rhythm.      Pulses: Normal pulses.      Heart sounds: Normal heart sounds.      Comments: 2-3+ edema Neg Abram's  Pulmonary:      Effort: Pulmonary effort is normal.      Breath sounds: Normal breath sounds. No wheezing or rales.   Abdominal:      General: Bowel sounds are normal.      Palpations: Abdomen is soft.      Comments: obese   Musculoskeletal:         General: Swelling present.      Comments: Moving well for age, crepitance in knees   Skin:     General: Skin is warm and dry.      Findings: Erythema present.      Comments: Legs with edema show some redness near ankle   Neurological:      Mental Status: He is alert and oriented to person, place, and time. Mental status is at baseline.           Assessment and Plan (including Health Maintenance)      Problem List  Smart Sets  Document Outside HM   :    Plan:   Elevate legs, lower salt in diet. Find and use compression stockings.   Refill Losartan.        Health Maintenance Due   Topic Date Due    Hepatitis C Screening  Never done    TETANUS VACCINE  10/31/2012    Shingles Vaccine (1 of 2) 01/03/2017    COVID-19 Vaccine (4 - Booster for Moderna series) 06/02/2022       Problem List Items Addressed This Visit    None     Visit Diagnoses     Primary hypertension    -  Primary    Improved after last stent, try to keep 120 to 130 systolic    Coronary artery disease involving native coronary artery of native heart, unspecified whether angina present        Keep follow up tomorrow, with edema may need another US of legs or heart with Leleux.     Edema leg        Use compression stockings, he has some already. Elevate when they are hurting or red.     Frail elderly        Prostate cancer               Health Maintenance Topics with due status: Not Due       Topic Last Completion Date    Lipid Panel 11/09/2021       Future Appointments   Date Time Provider Department Center   8/15/2022  9:40 AM Renuka Adams MD [facility] P[facility]            Signature:  Renuka Adams MD  [facility]  [address] [place] [address]    Date of encounter: 5/9/22

## 2022-05-10 ENCOUNTER — TELEPHONE (OUTPATIENT)
Dept: PRIMARY CARE CLINIC | Facility: CLINIC | Age: 82
End: 2022-05-10
Payer: MEDICARE

## 2022-05-10 NOTE — TELEPHONE ENCOUNTER
Labs faxed to Dr. Draper's office    ----- Message from Shruthi Shelby sent at 5/10/2022  1:32 PM CDT -----  Regarding: Fax labs  Type:  Needs Medical Advice    Who Called: Dr. Draper's office  Symptoms (please be specific):    How long has patient had these symptoms:    Pharmacy name and phone #:    Would the patient rather a call back or a response via [facility]?   Best Call Back Number: [phone]  Additional Information: Fax is [phone]. Please fax over most recent labs orders for patient

## 2022-05-14 NOTE — H&P
Patient:   Marquis Quintero            MRN: [id number]            FIN: [id number]               Age:   81 years     Sex:  Male     :  1940   Associated Diagnoses:   None   Author:   Kirit Mack MD      Basic Information     81-year-old male with history of vascular disease developed acute onset of numbness on the right side of his face lasting about 10 to 15 minutes.  He also felt a little weak and dizzy during that time.  In addition he may have had some scapular pain but his description is somewhat poor.  He has had a couple of similar episodes to this in the past.  All neurologic symptoms had resolved prior to him reaching the emergency department.  He is being placed in observation status and being worked up for TIA with probable discharge tomorrow.  Cardiac echo, carotid ultrasound, morning lipid levels have been ordered.  Is already on a baby aspirin daily.  He denies chest pain to me.  Said about a year ago he had about 50% bilateral carotid artery stenosis.      Chief Complaint   3/17/2022 10:15 CDT      Around 0800, felt pain between his shoulder blades and chest, phoned ambulance, EKG was performed by [facility]. Patient drove himself to ER        Review of Systems   Some chronic visual problems partial vision loss and occasional numbness in his feet.  10 systems reviewed and negative except as noted.      Health Status   Allergies:    Allergic Reactions (Selected)  No Known Allergies,    Allergies (1) Active Reaction  No Known Allergies None Documented     Current medications:  (Selected)   Inpatient Medications  Ordered  Ambien 5 mg oral tablet: 5 mg, form: Tab, Oral, At Bedtime PRN for insomnia, first dose 22 15:15:00 CDT, May repeat 30 minutes later once per evening if initial 5mg dose ineffective  Dilaudid: 1 mg, form: Injection, IV Push, q4hr PRN for pain, severe, first dose 22 15:15:00 CDT, User for severe pain or if Pt is NPO with any pain  Dulcolax Laxative 5 mg  ORAL enteric coated tablet: 10 mg, form: Tab-EC, Oral, Daily PRN for constipation, first dose 03/17/22 15:15:00 CDT  Mitosol 0.2 mg ophthalmic kit: 0.2 mg, form: Kit, Intraocular, Once, first dose 06/25/19 8:00:00 CDT, stop date 06/25/19 8:00:00 CDT  Mitosol 0.2 mg ophthalmic kit: 0.2 mg, form: Kit, Intraocular, Once, first dose 12/22/20 15:00:00 CST, stop date 12/22/20 15:00:00 CST  Norco  10 mg-325 mg oral tablet: 1 tab(s), form: Tab, Oral, q4hr PRN for pain, first dose 03/17/22 15:15:00 CDT  Tessalon Perles: 200 mg, form: Cap, Oral, q8hr PRN for cough, first dose 03/17/22 15:15:00 CDT  Tylenol 325 mg oral tablet: 650 mg, form: Tab, Oral, q4hr PRN for fever, first dose 03/17/22 15:15:00 CDT, > 38°C (100.4°F)  Tylenol 325 mg oral tablet: 650 mg, form: Tab, Oral, q4hr PRN for pain, mild, first dose 03/17/22 15:15:00 CDT  Zofran 2 mg/mL injectable solution: 4 mg, form: Injection, IV, q4hr PRN for nausea/vomiting, first dose 03/17/22 15:15:00 CDT  Zofran ORAL tab: 8 mg, form: Tab-Dis, Oral, QID PRN for nausea/vomiting, first dose 03/17/22 15:15:00 CDT, If no IV access  cloniDINE 0.1 mg oral tablet: 0.1 mg, form: Tab, Oral, QID PRN for hypertension, first dose 03/17/22 15:15:00 CDT, NOW, PRN SBP > 160  diphenhydrAMINE  IV Push: 25 mg, form: Injection, IV, q4hr PRN for itching, first dose 03/17/22 15:15:00 CDT, Use if itching and Pt is NPO  diphenhydrAMINE  ORAL: 25 mg, form: Cap, Oral, q4hr PRN for itching, first dose 03/17/22 15:15:00 CDT  hydrALAZINE 20 mg/mL injectable solution: 10 mg, form: Injection, IV, q3hr PRN for hypertension, first dose 03/17/22 15:15:00 CDT, SBP>160, use if NPO or if PRN Clonidine does npot reduce SBP to <160  Pending Complete  midazolam: 2 mg, form: Injection, IV Push, q5min, Order duration: 2 dose(s), first dose 02/26/19 9:31:00 CST, stop date 02/26/19 9:40:00 CST, STAT, (up to 5 mg for moderate anxiety)  Prescriptions  Prescribed  Metoprolol Tartrate 100 mg oral tablet: See Instructions,  Take 1/2 (one-half) tablet by mouth twice daily, # 90 tab(s), 1 Refill(s), Pharmacy: [facility], 170, cm, Height/Length Dosing, 11/08/21 9:11:00 CST, 101.6, kg, Weight Dosing, 11/08/21 9:11:00 CST  aspirin 81 mg oral tablet: 81 mg = 1 tab(s), Oral, Daily, # 30 tab(s), 0 Refill(s), other reason (Rx)  hydrALAZINE 25 mg oral tablet: See Instructions, Take 1 tablet by mouth twice daily, # 180 tab(s), 1 Refill(s), Pharmacy: [facility], 170, cm, Height/Length Dosing, 05/06/21 9:09:00 CDT, 103.3, kg, Weight Dosing, 05/06/21 9:09:00 CDT  hydrochlorothiazide 25 mg oral tablet: See Instructions, TAKE ONE-HALF TABLET BY MOUTH TWICE DAILY CAN  DO A WHOLE TABLET ONCE A DAY IF PREFERRED, # 90 tab(s), 1 Refill(s), Pharmacy: [id number]  losartan 100 mg oral tablet: 100 mg = 1 tab(s), Oral, Daily, # 90 tab(s), 1 Refill(s), Pharmacy: [facility], 170, cm, Height/Length Dosing, 11/08/21 9:11:00 CST, 101.6, kg, Weight Dosing, 11/08/21 9:11:00 CST  Documented Medications  Documented  Advil: 200 mg, Oral, q6hr, 0 Refill(s)  TIMOLOL MAL  SOL 0.5% OP:   Zioptan 0.0015% ophthalmic solution: 1 drop(s), Eye-Both, Once, # 3 mL, 0 Refill(s)  finasteride 5 mg oral tablet: See Instructions, 0 Refill(s)  furosemide 40 mg oral tablet: 40 mg = 1 tab(s), Oral, Daily  tamsulosin 0.4 mg oral capsule: 0.4 mg = 1 cap(s), Oral, Daily, 0 Refill(s)   Problem list:    All Problems  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 / Confirmed  Benign prostatic hyperplasia / SNOMED CT 946512099 / Confirmed  Cataract / SNOMED CT 447022312 / Confirmed  Coronary atherosclerosis / SNOMED CT 1152925274 / Confirmed  Hearing aid / SNOMED CT 94215239 / Confirmed  Hearing loss / SNOMED CT 19792836 / Confirmed  HTN - Hypertension / SNOMED CT 2827325037 / Confirmed  Obesity / SNOMED CT 8906585692 / Confirmed  Osteoarthritis / SNOMED CT 0227618643 / Confirmed,    Active Problems (9)  Benign prostatic hyperplasia   Cataract    Coronary atherosclerosis   Hearing aid   Hearing loss   HTN - Hypertension   Obesity   Osteoarthritis   Prostatic adenocarcinoma         Histories   Past Medical History:    Resolved  Colon polyp ([id number]): Onset on 2017 at 76 years.  Resolved.  Allergic conjunctivitis ([id number]):  Resolved.  Glaucoma ([id number]):  Resolved.  Internal hemorrhoid ([id number]):  Resolved.  Aortic regurgitation (969418274):  Resolved.  Carotid artery stenosis (659530003):  Resolved.  Mitral regurgitation ([id number]):  Resolved.  Skin cancer (7752676379):  Resolved.   Family History:    Alzheimer's disease  Father ()  Heart disease  Mother  Colon cancer  Mother     Procedure history:    66828 - TRANSLUMINAL DILATATION OF AQU. OUTFLOW CA W/O RETENTION OF DEVICE OR STENT (Left) on 2020 at 80 Years.  Comments:  2020 15:56 Jana Ivy RN  auto-populated from documented surgical case  39210 - LT EXTRACAP CATARACT  EXTRACTION W/ IOL (Left) on 2020 at 80 Years.  Comments:  2020 15:56 Jana Ivy RN  auto-populated from documented surgical case  67191 - RT AQUEOUS SHUNT TO EOR (Right) on 2019 at 78 Years.  Comments:  2019 9:03 Kiara Brito RN  auto-populated from documented surgical case  0449T - INS. OF AQ. DRAINAGE DEVICE W/O EXTRAOCULA (Right) on 2019 at 78 Years.  Comments:  2019 10:56 MARCELLA Evans RN, Camila HERRERA  auto-populated from documented surgical case  62650 - RT EXTRACAP CATARACT  EXTRACTION W/ IOL (Right) on 2019 at 78 Years.  Comments:  2019 16:11 Susy Saunders  auto-populated from documented surgical case  Colonoscopy ([id number]) in the month of 2017 at 76 Years.  Biopsy of prostate ([id number]) on 12/15/2015 at 75 Years.  Comments:  2015 13:20 MARCELLA - Bryan CASTILLO, Renuka A  Adenocarcinoma  Biopsy of prostate  ([id number]) on 8/28/2013 at 73 Years.  Comments:  2/12/2015 16:38 CST - Contributor_system, [id number]  09/05/2013 10:05:59 - Bryan CASTILLO, Renuka: inflammation, Dr Scott  Cystoscopy ([id number]) on 8/19/2013 at 73 Years.  Tonsil ([id number]).  Triple coronary bypass ([id number]).   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        Physical Examination   Vital Signs   3/17/2022 14:36 CDT      Peripheral Pulse Rate     54 bpm  LOW                             Heart Rate Monitored      54 bpm  LOW                             Respiratory Rate          17 br/min                             SpO2                      97 %                             Oxygen Therapy            Room air                             Systolic Blood Pressure   162 mmHg  HI                             Diastolic Blood Pressure  72 mmHg                             Mean Arterial Pressure, Cuff              102 mmHg    3/17/2022 13:49 CDT      Peripheral Pulse Rate     46 bpm  LOW                             Heart Rate Monitored      45 bpm  LOW                             Respiratory Rate          18 br/min                             SpO2                      94 %                             Oxygen Therapy            Room air                             Systolic Blood Pressure   180 mmHg  HI                              Diastolic Blood Pressure  102 mmHg  HI                             Mean Arterial Pressure, Cuff              128 mmHg    3/17/2022 12:00 CDT      Peripheral Pulse Rate     46 bpm  LOW                             Heart Rate Monitored      46 bpm  LOW                             Respiratory Rate          20 br/min                             SpO2                      94 %                             Oxygen Therapy            Room air    3/17/2022 10:58 CDT      Peripheral Pulse Rate     46 bpm  LOW                             Heart Rate Monitored      46 bpm  LOW                             Respiratory Rate          20 br/min                             SpO2                      96 %                             Oxygen Therapy            Room air                             Systolic Blood Pressure   150 mmHg  HI                             Diastolic Blood Pressure  72 mmHg    3/17/2022 10:15 CDT      Temperature Oral          36.5 DegC                             Temperature Oral (calculated)             97.70 DegF                             Peripheral Pulse Rate     56 bpm  LOW                             SpO2                      98 %                             Systolic Blood Pressure   170 mmHg  HI                             Diastolic Blood Pressure  76 mmHg        No qualifying data available     General: well-developed well-nourished in no acute distress  Eye: PERRLA, EOMI, clear conjunctiva, eyelids normal  HENT: Oropharynx without erythema/exudate, oropharynx and nasal mucosal surfaces moist  Neck: No thyromegaly or lymphadenopathy  Respiratory: clear to auscultation bilaterally  Cardiovascular: regular rate and rhythm without murmurs, gallops or rubs, no carotid bruits  Gastrointestinal: soft, non-tender, non-distended with normal bowel sounds, without masses to palpation  Integumentary: no rashes or skin lesions present  Neurologic: cranial nerves grossly intact, no signs of peripheral  neurological deficit  Psych: Appropriate affect, not anxious or depressed      Impression and Plan     TIA  -Continue daily aspirin  -Carotid ultrasound  -Check echo  -Fasting lipid level  -Blood pressure control  -If no modifiable risk factors based on above we will continue daily aspirin and add Plavix    Hypertension uncontrolled  -Resume home meds and adjust as necessary  -As needed clonidine/hydralazine    History of prostate cancer  Hard of hearing  Glaucoma  CAD  -All stable    CODE STATUS is full code  VTE prophylaxis with early ambulation as patient is at low risk

## 2022-05-14 NOTE — DISCHARGE SUMMARY
Patient:   Marquis Quintero            MRN: [id number]            FIN: [id number]               Age:   81 years     Sex:  Male     :  1940   Associated Diagnoses:   None   Author:   Kirit Mack MD      Basic Information     81-year-old male with history of vascular disease developed acute onset of numbness on the right side of his face lasting about 10 to 15 minutes.  He also felt a little weak and dizzy during that time.  In addition he may have had some scapular pain but his description is somewhat poor.  He has had a couple of similar episodes to this in the past.  All neurologic symptoms had resolved prior to him reaching the emergency department.  Fasting lipid panel looks acceptable, bilateral carotid ultrasound is okay, cardiac echo is pending.  His blood pressures on his home meds have remained quite high here with a systolic around 170-200.  I have increased his hydralazine from 25 mg twice daily to 50 mg 3 times daily and added Norvasc 10 mg daily which has brought his systolic down to the 130140 range.    Patient and wife were extremely anxious to get home early this morning and I put his discharge order in early to facilitate the process and patient had already left before I could get to his room so no face-to-face encounter on 3/19.  Prescriptions were sent to his pharmacy and yesterday he was instructed to keep a blood pressure log at home and bring it to his cardiology clinic appointment which is sometime next week.      Chief Complaint      Health Status   Allergies:    Allergic Reactions (Selected)  No Known Allergies,    Allergies (1) Active Reaction  No Known Allergies None Documented     Current medications:  (Selected)   Inpatient Medications  Ordered  Ambien 5 mg oral tablet: 5 mg, form: Tab, Oral, At Bedtime PRN for insomnia, first dose 22 15:15:00 CDT, May repeat 30 minutes later once per evening if initial 5mg dose ineffective  Dilaudid: 1 mg, form: Injection, IV Push,  q4hr PRN for pain, severe, first dose 03/17/22 15:15:00 CDT, User for severe pain or if Pt is NPO with any pain  Dulcolax Laxative 5 mg ORAL enteric coated tablet: 10 mg, form: Tab-EC, Oral, Daily PRN for constipation, first dose 03/17/22 15:15:00 CDT  Mitosol 0.2 mg ophthalmic kit: 0.2 mg, form: Kit, Intraocular, Once, first dose 06/25/19 8:00:00 CDT, stop date 06/25/19 8:00:00 CDT  Mitosol 0.2 mg ophthalmic kit: 0.2 mg, form: Kit, Intraocular, Once, first dose 12/22/20 15:00:00 CST, stop date 12/22/20 15:00:00 CST  Norco  10 mg-325 mg oral tablet: 1 tab(s), form: Tab, Oral, q4hr PRN for pain, first dose 03/17/22 15:15:00 CDT  Norvasc: 10 mg, form: Tab, Oral, Daily, first dose 03/18/22 18:15:00 CDT  Tessalon Perles: 200 mg, form: Cap, Oral, q8hr PRN for cough, first dose 03/17/22 15:15:00 CDT  Tylenol 325 mg oral tablet: 650 mg, form: Tab, Oral, q4hr PRN for fever, first dose 03/17/22 15:15:00 CDT, > 38°C (100.4°F)  Tylenol 325 mg oral tablet: 650 mg, form: Tab, Oral, q4hr PRN for pain, mild, first dose 03/17/22 15:15:00 CDT  Zofran 2 mg/mL injectable solution: 4 mg, form: Injection, IV, q4hr PRN for nausea/vomiting, first dose 03/17/22 15:15:00 CDT  Zofran ORAL tab: 8 mg, form: Tab-Dis, Oral, QID PRN for nausea/vomiting, first dose 03/17/22 15:15:00 CDT, If no IV access  aspirin 81 mg oral tablet, CHEWABLE: 81 mg, form: Tab-Chew, Oral, Daily, first dose 03/18/22 9:00:00 CDT  cloniDINE 0.1 mg oral tablet: 0.1 mg, form: Tab, Oral, QID PRN for hypertension, first dose 03/17/22 15:15:00 CDT, NOW, PRN SBP > 160  diphenhydrAMINE  IV Push: 25 mg, form: Injection, IV, q4hr PRN for itching, first dose 03/17/22 15:15:00 CDT, Use if itching and Pt is NPO  diphenhydrAMINE  ORAL: 25 mg, form: Cap, Oral, q4hr PRN for itching, first dose 03/17/22 15:15:00 CDT  finasteride: 5 mg, form: Tab, Oral, Daily, first dose 03/18/22 9:00:00 CDT  furosemide 40 mg oral tablet: 40 mg, form: Tab, Oral, Daily, first dose 03/18/22 9:00:00  CDT  hydrALAZINE (Apresoline) Oral: 50 mg, form: Tab, Oral, TID, first dose 03/18/22 14:07:00 CDT  hydrALAZINE 20 mg/mL injectable solution: 10 mg, form: Injection, IV, q3hr PRN for hypertension, first dose 03/17/22 15:15:00 CDT, SBP>160, use if NPO or if PRN Clonidine does npot reduce SBP to <160  hydrochlorothiazide 25 mg oral tablet: 12.5 mg, form: Tab, Oral, Daily, first dose 03/18/22 9:00:00 CDT  losartan: 100 mg, form: Tab, Oral, Daily, first dose 03/18/22 9:00:00 CDT  metoprolol tartrate 25 mg oral tab: 25 mg, form: Tab, Oral, BID, first dose 03/17/22 21:07:00 CDT  tamsulosin 0.4 mg oral capsule: 0.8 mg, form: Cap, Oral, At Bedtime, first dose 03/17/22 21:00:00 CDT  Pending Complete  midazolam: 2 mg, form: Injection, IV Push, q5min, Order duration: 2 dose(s), first dose 02/26/19 9:31:00 CST, stop date 02/26/19 9:40:00 CST, STAT, (up to 5 mg for moderate anxiety)  Prescriptions  Prescribed  Metoprolol Tartrate 100 mg oral tablet: See Instructions, Take 1/2 (one-half) tablet by mouth twice daily, # 90 tab(s), 1 Refill(s), Pharmacy: [id number], 170, cm, Height/Length Dosing, 11/08/21 9:11:00 CST, 101.6, kg, Weight Dosing, 11/08/21 9:11:00 CST  Norvasc 10 mg oral tablet: 10 mg = 1 tab(s), Oral, Daily, # 30 tab(s), 11 Refill(s), Pharmacy: [id number], 170.1, cm, Height/Length Dosing, 03/17/22 16:06:00 CDT, 101.2, kg, Weight Dosing, 03/17/22 15:55:00 CDT  hydrALAZINE 50 mg oral tablet: 50 mg = 1 tab(s), Oral, TID, # 90 tab(s), 11 Refill(s), Pharmacy: [facility], 170.1, cm, Height/Length Dosing, 03/17/22 16:06:00 CDT, 101.2, kg, Weight Dosing, 03/17/22 15:55:00 CDT  hydrochlorothiazide 25 mg oral tablet: See Instructions, TAKE ONE-HALF TABLET BY MOUTH TWICE DAILY CAN  DO A WHOLE TABLET ONCE A DAY IF PREFERRED, # 90 tab(s), 1 Refill(s), Pharmacy: [id number]  losartan 100 mg oral tablet: 100 mg = 1 tab(s), Oral, Daily, # 90 tab(s), 1 Refill(s), Pharmacy: [id number],  170, cm, Height/Length Dosing, 21 9:11:00 CST, 101.6, kg, Weight Dosing, 21 9:11:00 CST  Documented Medications  Documented  Advil: 200 mg, Oral, q6hr, 0 Refill(s)  aspirin 81 mg oral tablet, CHEWABLE: 81 mg = 1 tab(s), Oral, Daily, 0 Refill(s)  finasteride 5 mg oral tablet: 5 mg = 1 tab(s), Oral, Daily, 0 Refill(s)  furosemide 40 mg oral tablet: 40 mg = 1 tab(s), Oral, Daily  tamsulosin 0.4 mg oral capsule: 0.8 mg = 2 cap(s), Oral, At Bedtime, 0 Refill(s)   Problem list:    All Problems  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 / Confirmed  Benign prostatic hyperplasia / SNOMED CT 975238747 / Confirmed  Cataract / SNOMED CT 634837143 / Confirmed  Coronary atherosclerosis / SNOMED CT 4677885121 / Confirmed  Hearing aid / SNOMED CT 06744259 / Confirmed  Hearing loss / SNOMED CT 09392309 / Confirmed  HTN - Hypertension / SNOMED CT 7324206589 / Confirmed  Obesity / SNOMED CT 1477324673 / Confirmed  Osteoarthritis / SNOMED CT 8773333603 / Confirmed,    Active Problems (9)  Benign prostatic hyperplasia   Cataract   Coronary atherosclerosis   Hearing aid   Hearing loss   HTN - Hypertension   Obesity   Osteoarthritis   Prostatic adenocarcinoma         Histories   Past Medical History:    Resolved  Colon polyp ([id number]): Onset on 2017 at 76 years.  Resolved.  Allergic conjunctivitis ([id number]):  Resolved.  Glaucoma ([id number]):  Resolved.  Internal hemorrhoid ([id number]):  Resolved.  Aortic regurgitation (919005804):  Resolved.  Carotid artery stenosis ([id number]):  Resolved.  Mitral regurgitation (53816822):  Resolved.  Skin cancer (2480726833):  Resolved.   Family History:    Alzheimer's disease  Father ()  Heart disease  Mother  Colon cancer  Mother     Procedure history:    27075 - TRANSLUMINAL DILATATION OF AQU. OUTFLOW CA W/O RETENTION OF DEVICE OR STENT (Left) on 2020 at 80  Years.  Comments:  12/22/2020 15:56 Jana Ivy RN  auto-populated from documented surgical case  87432 - LT EXTRACAP CATARACT  EXTRACTION W/ IOL (Left) on 12/22/2020 at 80 Years.  Comments:  12/22/2020 15:56 Jana Ivy RN  auto-populated from documented surgical case  44869 - RT AQUEOUS SHUNT TO EOR (Right) on 6/25/2019 at 78 Years.  Comments:  6/25/2019 9:03 KHANG Chavarria RN, Kiara Donaldson  auto-populated from documented surgical case  [id number] - INS. OF AQ. DRAINAGE DEVICE W/O EXTRAOCULA (Right) on 2/26/2019 at 78 Years.  Comments:  2/26/2019 10:56 Camila Guido RN  auto-populated from documented surgical case  86990 - RT EXTRACAP CATARACT  EXTRACTION W/ IOL (Right) on 2/26/2019 at 78 Years.  Comments:  2/28/2019 16:11 Susy Saunders  auto-populated from documented surgical case  Colonoscopy ([id number]) in the month of 4/2017 at 76 Years.  Biopsy of prostate ([id number]) on 12/15/2015 at 75 Years.  Comments:  12/11/2015 13:20 MARCELLA - Renuka Adams MD A  Adenocarcinoma  Biopsy of prostate ([id number]) on 8/28/2013 at 73 Years.  Comments:  2/12/2015 16:38 CST - Contributor_system, [email or web address]  09/05/2013 10:05:59 - Renuka Adams MD: inflammation, Dr Scott  Cystoscopy ([id number]) on 8/19/2013 at 73 Years.  Tonsil ([id number]).  Triple coronary bypass ([id number]).   Social History        Social & Psychosocial Habits    Alcohol  03/16/2015 Risk Assessment: Denies Alcohol Use    03/17/2022  Use: Never    Employment/School  09/11/2015  Status: Retired    Description: offshore     Activity level: Occasional physical work    Home/Environment  09/11/2015  Lives with: Ashley, Spouse    Comment: 4 children - 09/11/2015 10:15 - Duglas Cardoza LPN    Substance Use  06/25/2019 Risk Assessment: Denies Substance Abuse    Tobacco  03/17/2022  Use: Never (less than 100 in l    Patient Wants Consult For Cessation Counseling  No    Abuse/Neglect  03/17/2022  SHX Any signs of abuse or neglect No    Feels unsafe at home: No    Safe place to go: Yes  .        Physical Examination   Vital Signs   3/19/2022 8:00 CDT       Oxygen Therapy            Room air    3/19/2022 7:07 CDT       Peripheral Pulse Rate     59 bpm  LOW                             SpO2                      94 %                             Systolic Blood Pressure   132 mmHg                             Diastolic Blood Pressure  66 mmHg                             Mean Arterial Pressure, Cuff              88 mmHg    3/19/2022 5:00 CDT       Heart Rate Monitored      58 bpm  LOW                             Systolic Blood Pressure   133 mmHg                             Diastolic Blood Pressure  68 mmHg    3/19/2022 4:00 CDT       Heart Rate Monitored      56 bpm  LOW    3/19/2022 3:00 CDT       Temperature Oral          36.6 DegC                             Temperature Oral (calculated)             97.88 DegF                             Heart Rate Monitored      55 bpm  LOW                             SpO2                      93 %  LOW                             Systolic Blood Pressure   142 mmHg  HI                             Diastolic Blood Pressure  69 mmHg    3/19/2022 0:00 CDT       Heart Rate Monitored      52 bpm  LOW    3/18/2022 23:00 CDT      Temperature Oral          36.4 DegC                             Temperature Oral (calculated)             97.52 DegF                             Heart Rate Monitored      58 bpm  LOW                             SpO2                      94 %                             Systolic Blood Pressure   127 mmHg                             Diastolic Blood Pressure  60 mmHg    3/18/2022 21:52 CDT      Heart Rate Monitored      58 bpm  LOW                             Systolic Blood Pressure   168 mmHg  HI                             Diastolic Blood Pressure  77 mmHg    3/18/2022 20:00 CDT      Heart Rate Monitored      58 bpm  LOW                              Oxygen Therapy            Room air    3/18/2022 19:43 CDT      Temperature Oral          36.4 DegC                             Temperature Oral (calculated)             97.52 DegF                             Peripheral Pulse Rate     60 bpm                             SpO2                      94 %                             Systolic Blood Pressure   158 mmHg  HI                             Diastolic Blood Pressure  67 mmHg                             Mean Arterial Pressure, Cuff              97 mmHg    3/18/2022 17:00 CDT      Systolic Blood Pressure   212 mmHg  HI                             Diastolic Blood Pressure  92 mmHg  HI    3/18/2022 16:00 CDT      Heart Rate Monitored      53 bpm  LOW                             Oxygen Therapy            Room air                             Systolic Blood Pressure   209 mmHg  HI                             Diastolic Blood Pressure  87 mmHg    3/18/2022 15:09 CDT      Temperature Oral          36.6 DegC                             Temperature Oral (calculated)             97.88 DegF                             Peripheral Pulse Rate     51 bpm  LOW                             SpO2                      95 %                             Systolic Blood Pressure   194 mmHg  HI                             Diastolic Blood Pressure  86 mmHg                             Mean Arterial Pressure, Cuff              122 mmHg    3/18/2022 14:00 CDT      Oxygen Therapy            Room air    3/18/2022 12:00 CDT      Heart Rate Monitored      59 bpm  LOW                             Oxygen Therapy            Room air    3/18/2022 11:25 CDT      Temperature Oral          36.4 DegC                             Temperature Oral (calculated)             97.52 DegF                             Peripheral Pulse Rate     61 bpm                             SpO2                      92 %  LOW                             Systolic Blood Pressure   169 mmHg  HI                              Diastolic Blood Pressure  57 mmHg  LOW                             Mean Arterial Pressure, Cuff              94 mmHg    3/18/2022 10:00 CDT      Oxygen Therapy            Room air    3/18/2022 8:00 CDT       Oxygen Therapy            Room air    3/18/2022 7:22 CDT       Peripheral Pulse Rate     54 bpm  LOW                             SpO2                      92 %  LOW                             Systolic Blood Pressure   198 mmHg  HI                             Diastolic Blood Pressure  80 mmHg                             Mean Arterial Pressure, Cuff              119 mmHg    3/18/2022 7:00 CDT       Temperature Oral          36.7 DegC                             Temperature Oral (calculated)             98.06 DegF    3/18/2022 3:13 CDT       Temperature Oral          36.5 DegC                             Temperature Oral (calculated)             97.70 DegF                             Peripheral Pulse Rate     60 bpm                             SpO2                      94 %                             Systolic Blood Pressure   172 mmHg  HI                             Diastolic Blood Pressure  74 mmHg                             Mean Arterial Pressure, Cuff              106 mmHg    3/18/2022 3:00 CDT       Systolic Blood Pressure   159 mmHg  HI                             Diastolic Blood Pressure  69 mmHg        Vital Signs (last 24 hrs)_____  Last Charted___________  Temp Oral     36.6 DegC  (MAR 19 03:00)  Heart Rate Peripheral   L 59bpm  (MAR 19 07:07)  SBP      132 mmHg  (MAR 19 07:07)  DBP      66 mmHg  (MAR 19 07:07)  SpO2      94 %  (MAR 19 07:07)              Impression and Plan     TIA  -Continue daily aspirin  -Carotid ultrasound okay  -Echo shows only mild LVH  -Fasting lipid level okay  -Blood pressure control by increased home hydralazine to 50 mg 3 times daily and addition of Norvasc 10 mg daily  -Patient and wife have been educated on stroke symptoms and need for rapid presentation to hospital or  stroke center if symptoms return    Hypertension  History of prostate cancer  Hard of hearing  Glaucoma  CAD  -All stable

## 2022-05-14 NOTE — DISCHARGE SUMMARY
Patient:   Marquis Quintero            MRN: [id number]            FIN: [id number]               Age:   81 years     Sex:  Male     :  1940   Associated Diagnoses:   None   Author:   Kirit Mack MD      Basic Information     81-year-old male with history of vascular disease developed acute onset of numbness on the right side of his face lasting about 10 to 15 minutes.  He also felt a little weak and dizzy during that time.  In addition he may have had some scapular pain but his description is somewhat poor.  He has had a couple of similar episodes to this in the past.  All neurologic symptoms had resolved prior to him reaching the emergency department.  Fasting lipid panel looks acceptable, bilateral carotid ultrasound is okay, cardiac echo is pending.  His blood pressures on his home meds have remained quite high here with a systolic around 170.  I have increased his hydralazine from 25 mg twice daily to 50 mg 3 times daily.    Plans are for discharge to home today once we are sure his blood pressure has improved.  His wife became very agitated that he was not discharged soon as I walked out of his room which in turn I think gave patient some anxiety and predischarge blood pressure check showed a blood pressure of about 200.  We are waiting for him to calm down blood pressure can be rechecked and we will discharge him when he has a safe blood pressure.      Chief Complaint   3/17/2022 10:15 CDT      Around 0800, felt pain between his shoulder blades and chest, phoned ambulance, EKG was performed by [facility]. Patient drove himself to ER        Review of Systems   Some chronic visual problems partial vision loss and occasional numbness in his feet.  10 systems reviewed and negative except as noted.      Health Status   Allergies:    Allergic Reactions (Selected)  No Known Allergies,    Allergies (1) Active Reaction  No Known Allergies None Documented     Current medications:  (Selected)    Inpatient Medications  Ordered  Ambien 5 mg oral tablet: 5 mg, form: Tab, Oral, At Bedtime PRN for insomnia, first dose 03/17/22 15:15:00 CDT, May repeat 30 minutes later once per evening if initial 5mg dose ineffective  Dilaudid: 1 mg, form: Injection, IV Push, q4hr PRN for pain, severe, first dose 03/17/22 15:15:00 CDT, User for severe pain or if Pt is NPO with any pain  Dulcolax Laxative 5 mg ORAL enteric coated tablet: 10 mg, form: Tab-EC, Oral, Daily PRN for constipation, first dose 03/17/22 15:15:00 CDT  Mitosol 0.2 mg ophthalmic kit: 0.2 mg, form: Kit, Intraocular, Once, first dose 06/25/19 8:00:00 CDT, stop date 06/25/19 8:00:00 CDT  Mitosol 0.2 mg ophthalmic kit: 0.2 mg, form: Kit, Intraocular, Once, first dose 12/22/20 15:00:00 CST, stop date 12/22/20 15:00:00 CST  Norco  10 mg-325 mg oral tablet: 1 tab(s), form: Tab, Oral, q4hr PRN for pain, first dose 03/17/22 15:15:00 CDT  Tessalon Perles: 200 mg, form: Cap, Oral, q8hr PRN for cough, first dose 03/17/22 15:15:00 CDT  Tylenol 325 mg oral tablet: 650 mg, form: Tab, Oral, q4hr PRN for fever, first dose 03/17/22 15:15:00 CDT, > 38°C (100.4°F)  Tylenol 325 mg oral tablet: 650 mg, form: Tab, Oral, q4hr PRN for pain, mild, first dose 03/17/22 15:15:00 CDT  Zofran 2 mg/mL injectable solution: 4 mg, form: Injection, IV, q4hr PRN for nausea/vomiting, first dose 03/17/22 15:15:00 CDT  Zofran ORAL tab: 8 mg, form: Tab-Dis, Oral, QID PRN for nausea/vomiting, first dose 03/17/22 15:15:00 CDT, If no IV access  aspirin 81 mg oral tablet, CHEWABLE: 81 mg, form: Tab-Chew, Oral, Daily, first dose 03/18/22 9:00:00 CDT  cloniDINE 0.1 mg oral tablet: 0.1 mg, form: Tab, Oral, QID PRN for hypertension, first dose 03/17/22 15:15:00 CDT, NOW, PRN SBP > 160  diphenhydrAMINE  IV Push: 25 mg, form: Injection, IV, q4hr PRN for itching, first dose 03/17/22 15:15:00 CDT, Use if itching and Pt is NPO  diphenhydrAMINE  ORAL: 25 mg, form: Cap, Oral, q4hr PRN for itching, first dose  03/17/22 15:15:00 CDT  finasteride: 5 mg, form: Tab, Oral, Daily, first dose 03/18/22 9:00:00 CDT  furosemide 40 mg oral tablet: 40 mg, form: Tab, Oral, Daily, first dose 03/18/22 9:00:00 CDT  hydrALAZINE (Apresoline) Oral: 50 mg, form: Tab, Oral, TID, first dose 03/18/22 14:07:00 CDT  hydrALAZINE 20 mg/mL injectable solution: 10 mg, form: Injection, IV, q3hr PRN for hypertension, first dose 03/17/22 15:15:00 CDT, SBP>160, use if NPO or if PRN Clonidine does npot reduce SBP to <160  hydrochlorothiazide 25 mg oral tablet: 12.5 mg, form: Tab, Oral, Daily, first dose 03/18/22 9:00:00 CDT  losartan: 100 mg, form: Tab, Oral, Daily, first dose 03/18/22 9:00:00 CDT  metoprolol tartrate 25 mg oral tab: 25 mg, form: Tab, Oral, BID, first dose 03/17/22 21:07:00 CDT  tamsulosin 0.4 mg oral capsule: 0.8 mg, form: Cap, Oral, At Bedtime, first dose 03/17/22 21:00:00 CDT  Pending Complete  midazolam: 2 mg, form: Injection, IV Push, q5min, Order duration: 2 dose(s), first dose 02/26/19 9:31:00 CST, stop date 02/26/19 9:40:00 CST, STAT, (up to 5 mg for moderate anxiety)  Prescriptions  Prescribed  Metoprolol Tartrate 100 mg oral tablet: See Instructions, Take 1/2 (one-half) tablet by mouth twice daily, # 90 tab(s), 1 Refill(s), Pharmacy: [id number], 170, cm, Height/Length Dosing, 11/08/21 9:11:00 CST, 101.6, kg, Weight Dosing, 11/08/21 9:11:00 CST  hydrALAZINE 50 mg oral tablet: 50 mg = 1 tab(s), Oral, TID, # 90 tab(s), 11 Refill(s), Pharmacy: [facility], 170.1, cm, Height/Length Dosing, 03/17/22 16:06:00 CDT, 101.2, kg, Weight Dosing, 03/17/22 15:55:00 CDT  hydrochlorothiazide 25 mg oral tablet: See Instructions, TAKE ONE-HALF TABLET BY MOUTH TWICE DAILY CAN  DO A WHOLE TABLET ONCE A DAY IF PREFERRED, # 90 tab(s), 1 Refill(s), Pharmacy: [id number]  losartan 100 mg oral tablet: 100 mg = 1 tab(s), Oral, Daily, # 90 tab(s), 1 Refill(s), Pharmacy: [facility], 170, cm, Height/Length Dosing,  21 9:11:00 CST, 101.6, kg, Weight Dosing, 21 9:11:00 CST  Documented Medications  Documented  Advil: 200 mg, Oral, q6hr, 0 Refill(s)  aspirin 81 mg oral tablet, CHEWABLE: 81 mg = 1 tab(s), Oral, Daily, 0 Refill(s)  finasteride 5 mg oral tablet: 5 mg = 1 tab(s), Oral, Daily, 0 Refill(s)  furosemide 40 mg oral tablet: 40 mg = 1 tab(s), Oral, Daily  tamsulosin 0.4 mg oral capsule: 0.8 mg = 2 cap(s), Oral, At Bedtime, 0 Refill(s)   Problem list:    All Problems  Prostatic adenocarcinoma / SNOMED CT 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 / Confirmed  Benign prostatic hyperplasia / SNOMED CT 781426378 / Confirmed  Cataract / SNOMED CT 818619609 / Confirmed  Coronary atherosclerosis / SNOMED CT 5596540071 / Confirmed  Hearing aid / SNOMED CT 49619336 / Confirmed  Hearing loss / SNOMED CT 65880826 / Confirmed  HTN - Hypertension / SNOMED CT 0456935900 / Confirmed  Obesity / SNOMED CT 0646286191 / Confirmed  Osteoarthritis / SNOMED CT 5874195769 / Confirmed,    Active Problems (9)  Benign prostatic hyperplasia   Cataract   Coronary atherosclerosis   Hearing aid   Hearing loss   HTN - Hypertension   Obesity   Osteoarthritis   Prostatic adenocarcinoma         Histories   Past Medical History:    Resolved  Colon polyp ([id number]): Onset on 2017 at 76 years.  Resolved.  Allergic conjunctivitis ([id number]):  Resolved.  Glaucoma (15602364):  Resolved.  Internal hemorrhoid ([id number]):  Resolved.  Aortic regurgitation (896681749):  Resolved.  Carotid artery stenosis (137610561):  Resolved.  Mitral regurgitation (19779805):  Resolved.  Skin cancer (6501198077):  Resolved.   Family History:    Alzheimer's disease  Father ()  Heart disease  Mother  Colon cancer  Mother     Procedure history:    74275 - TRANSLUMINAL DILATATION OF AQU. OUTFLOW CA W/O RETENTION OF DEVICE OR STENT (Left) on 2020 at 80 Years.  Comments:  2020 15:56 MARCELLA - Nas VALDEZ,  Jana Quintanilla  auto-populated from documented surgical case  99115 - LT EXTRACAP CATARACT  EXTRACTION W/ IOL (Left) on 12/22/2020 at 80 Years.  Comments:  12/22/2020 15:56 MARCELLA Lovell RN, Jana Quintanilla  auto-populated from documented surgical case  37987 - RT AQUEOUS SHUNT TO EOR (Right) on 6/25/2019 at 78 Years.  Comments:  6/25/2019 9:03 KHANG Chavarria RN, Kiara Donaldson  auto-populated from documented surgical case  0449T - INS. OF AQ. DRAINAGE DEVICE W/O EXTRAOCULA (Right) on 2/26/2019 at 78 Years.  Comments:  2/26/2019 10:56 MARCELLA Evans RN, Camila HERRERA  auto-populated from documented surgical case  25140 - RT EXTRACAP CATARACT  EXTRACTION W/ IOL (Right) on 2/26/2019 at 78 Years.  Comments:  2/28/2019 16:11 Susy Saunders  auto-populated from documented surgical case  Colonoscopy ([id number]) in the month of 4/2017 at 76 Years.  Biopsy of prostate ([id number]) on 12/15/2015 at 75 Years.  Comments:  12/11/2015 13:20 MARCELLA - Renuka Adams MD A  Adenocarcinoma  Biopsy of prostate ([id number]) on 8/28/2013 at 73 Years.  Comments:  2/12/2015 16:38 CST - Contributor_system, [id number]  09/05/2013 10:05:59 - Renuka Adams MD: inflammation, Dr Scott  Cystoscopy ([id number]) on 8/19/2013 at 73 Years.  Tonsil ([id number]).  Triple coronary bypass (829000393).   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        Physical Examination   Vital Signs   3/18/2022 16:00 CDT      Heart Rate Monitored      53 bpm  LOW                             Oxygen Therapy            Room air                             Systolic Blood Pressure   209 mmHg  HI                             Diastolic Blood Pressure  87 mmHg    3/18/2022 15:09 CDT      Temperature Oral          36.6 DegC                             Temperature Oral (calculated)             97.88 DegF                             Peripheral Pulse Rate     51 bpm  LOW                             SpO2                      95 %                             Systolic Blood Pressure   194 mmHg  HI                             Diastolic Blood Pressure  86 mmHg                             Mean Arterial Pressure, Cuff              122 mmHg    3/18/2022 14:00 CDT      Oxygen Therapy            Room air    3/18/2022 12:00 CDT      Heart Rate Monitored      59 bpm  LOW                             Oxygen Therapy            Room air    3/18/2022 11:25 CDT      Temperature Oral          36.4 DegC                             Temperature Oral (calculated)             97.52 DegF                             Peripheral Pulse Rate     61 bpm                             SpO2                      92 %  LOW                             Systolic Blood Pressure   169 mmHg  HI                             Diastolic Blood Pressure  57 mmHg  LOW                             Mean Arterial Pressure, Cuff              94 mmHg    3/18/2022 10:00 CDT      Oxygen Therapy            Room air    3/18/2022 8:00 CDT       Oxygen Therapy            Room air    3/18/2022 7:22 CDT       Peripheral Pulse Rate     54 bpm  LOW                             SpO2                      92 %  LOW                             Systolic Blood Pressure   198 mmHg  HI                             Diastolic Blood Pressure  80 mmHg                             Mean Arterial Pressure, Cuff               119 mmHg    3/18/2022 7:00 CDT       Temperature Oral          36.7 DegC                             Temperature Oral (calculated)             98.06 DegF    3/18/2022 3:13 CDT       Temperature Oral          36.5 DegC                             Temperature Oral (calculated)             97.70 DegF                             Peripheral Pulse Rate     60 bpm                             SpO2                      94 %                             Systolic Blood Pressure   172 mmHg  HI                             Diastolic Blood Pressure  74 mmHg                             Mean Arterial Pressure, Cuff              106 mmHg    3/18/2022 3:00 CDT       Systolic Blood Pressure   159 mmHg  HI                             Diastolic Blood Pressure  69 mmHg    3/17/2022 23:59 CDT      24 HR Intake Totals       0.5 mL                             24 HR Output Totals       0 mL                             24 HR I&O Balance         0.5 mL    3/17/2022 23:06 CDT      Temperature Oral          36.6 DegC                             Temperature Oral (calculated)             97.88 DegF                             Peripheral Pulse Rate     56 bpm  LOW                             SpO2                      94 %                             Systolic Blood Pressure   181 mmHg  HI                             Diastolic Blood Pressure  78 mmHg                             Mean Arterial Pressure, Cuff              112 mmHg    3/17/2022 23:00 CDT      Oxygen Therapy            Room air                             Systolic Blood Pressure   153 mmHg  HI                             Diastolic Blood Pressure  66 mmHg    3/17/2022 19:23 CDT      Temperature Oral          36.5 DegC                             Temperature Oral (calculated)             97.70 DegF                             Peripheral Pulse Rate     57 bpm  LOW                             SpO2                      95 %                             Systolic Blood Pressure   166 mmHg  HI                              Diastolic Blood Pressure  72 mmHg                             Mean Arterial Pressure, Cuff              103 mmHg    3/17/2022 17:55 CDT      Peripheral Pulse Rate     61 bpm                             Systolic Blood Pressure   196 mmHg  HI                             Diastolic Blood Pressure  80 mmHg                             Mean Arterial Pressure, Cuff              118 mmHg    3/17/2022 17:01 CDT      Oxygen Therapy            Room air    3/17/2022 16:08 CDT      Temperature Oral          36.3 DegC                             Temperature Oral (calculated)             97.34 DegF                             Peripheral Pulse Rate     59 bpm  LOW                             SpO2                      96 %                             Systolic Blood Pressure   181 mmHg  HI                             Diastolic Blood Pressure  86 mmHg                             Mean Arterial Pressure, Cuff              118 mmHg    3/17/2022 15:00 CDT      Temperature Oral          36.4 DegC                             Temperature Oral (calculated)             97.52 DegF                             Peripheral Pulse Rate     57 bpm  LOW                             SpO2                      97 %                             Systolic Blood Pressure   210 mmHg  HI                             Diastolic Blood Pressure  88 mmHg    3/17/2022 14:36 CDT      Peripheral Pulse Rate     54 bpm  LOW                             Heart Rate Monitored      54 bpm  LOW                             Respiratory Rate          17 br/min                             SpO2                      97 %                             Oxygen Therapy            Room air                             Systolic Blood Pressure   162 mmHg  HI                             Diastolic Blood Pressure  72 mmHg                             Mean Arterial Pressure, Cuff              102 mmHg    3/17/2022 13:49 CDT      Peripheral Pulse Rate     46 bpm  LOW                              Heart Rate Monitored      45 bpm  LOW                             Respiratory Rate          18 br/min                             SpO2                      94 %                             Oxygen Therapy            Room air                             Systolic Blood Pressure   180 mmHg  HI                             Diastolic Blood Pressure  102 mmHg  HI                             Mean Arterial Pressure, Cuff              128 mmHg    3/17/2022 12:00 CDT      Peripheral Pulse Rate     46 bpm  LOW                             Heart Rate Monitored      46 bpm  LOW                             Respiratory Rate          20 br/min                             SpO2                      94 %                             Oxygen Therapy            Room air    3/17/2022 10:58 CDT      Peripheral Pulse Rate     46 bpm  LOW                             Heart Rate Monitored      46 bpm  LOW                             Respiratory Rate          20 br/min                             SpO2                      96 %                             Oxygen Therapy            Room air                             Systolic Blood Pressure   150 mmHg  HI                             Diastolic Blood Pressure  72 mmHg    3/17/2022 10:15 CDT      Temperature Oral          36.5 DegC                             Temperature Oral (calculated)             97.70 DegF                             Peripheral Pulse Rate     56 bpm  LOW                             SpO2                      98 %                             Systolic Blood Pressure   170 mmHg  HI                             Diastolic Blood Pressure  76 mmHg        Vital Signs (last 24 hrs)_____  Last Charted___________  Temp Oral     36.6 DegC  (MAR 18 15:09)  Heart Rate Peripheral   L 51bpm  (MAR 18 15:09)  SBP      H 209mmHg  (MAR 18 16:00)  DBP      87 mmHg  (MAR 18 16:00)  SpO2      95 %  (MAR 18 15:09)       General: well-developed well-nourished in no acute distress  Eye:  PERRLA, EOMI, clear conjunctiva, eyelids normal  HENT: Oropharynx without erythema/exudate, oropharynx and nasal mucosal surfaces moist  Neck: No thyromegaly or lymphadenopathy  Respiratory: clear to auscultation bilaterally  Cardiovascular: regular rate and rhythm without murmurs, gallops or rubs, no carotid bruits  Gastrointestinal: soft, non-tender, non-distended with normal bowel sounds, without masses to palpation  Integumentary: no rashes or skin lesions present  Neurologic: cranial nerves grossly intact, no signs of peripheral neurological deficit  Psych: Appropriate affect, not anxious or depressed      Review / Management   Radiology results   Rad Results (ST)   Accession: [id number]  Order: US Venous Lower Extremity Right  Report Dt/Tm: 03/18/2022 13:34  Report:   RIGHT LOWER EXTREMITY VENOUS DUPLEX ULTRASOUND 3/18/2022     INDICATION: Right calf pain overnight.     TECHNIQUE: Multiple longitudinal and transverse grayscale, color  Doppler and spectral Doppler images of the deep veins of the right  lower extremity were obtained with and without compression and  augmentation.     COMPARISON: None available.        FINDINGS:  There is normal compression, augmentation and color Doppler signal  within the right common femoral, profunda femoris, superficial  femoral, and popliteal veins. There is normal color Doppler signal  within the visualized infrapopliteal veins.        IMPRESSION:  No evidence of right lower extremity deep vein thrombosis.          Impression and Plan     TIA  -Continue daily aspirin  -Carotid ultrasound okay  -Echo pending and can be checked at follow-up appointment but I do not expect any major cardiac problems  -Fasting lipid level okay  -Blood pressure control by increasing hydralazine to 50 mg 3 times daily  -Patient and wife have been educated on stroke symptoms and need for rapid presentation to hospital or stroke center if symptoms return    Hypertension uncontrolled  -Resumed  home meds and increased hydralazine to 50 mg 3 times daily    History of prostate cancer  Hard of hearing  Glaucoma  CAD  -All stable    CODE STATUS is full code  VTE prophylaxis with early ambulation as patient is at low risk    Discharge time greater than 30 minutes

## 2022-07-12 ENCOUNTER — LAB VISIT (OUTPATIENT)
Dept: LAB | Facility: HOSPITAL | Age: 82
End: 2022-07-12
Attending: INTERNAL MEDICINE
Payer: MEDICARE

## 2022-07-12 DIAGNOSIS — I10 ESSENTIAL HYPERTENSION, MALIGNANT: Primary | ICD-10-CM

## 2022-07-12 DIAGNOSIS — E78.5 HYPERLIPIDEMIA, UNSPECIFIED HYPERLIPIDEMIA TYPE: ICD-10-CM

## 2022-07-12 DIAGNOSIS — Z79.899 ENCOUNTER FOR LONG-TERM (CURRENT) USE OF OTHER MEDICATIONS: ICD-10-CM

## 2022-07-12 LAB
ALBUMIN SERPL-MCNC: 3.8 GM/DL (ref 3.4–4.8)
ALBUMIN/GLOB SERPL: 1.3 RATIO (ref 1.1–2)
ALP SERPL-CCNC: 68 UNIT/L (ref 40–150)
ALT SERPL-CCNC: 10 UNIT/L (ref 0–55)
AST SERPL-CCNC: 18 UNIT/L (ref 5–34)
BILIRUBIN DIRECT+TOT PNL SERPL-MCNC: 1 MG/DL
BUN SERPL-MCNC: 16.6 MG/DL (ref 8.4–25.7)
CALCIUM SERPL-MCNC: 9.2 MG/DL (ref 8.8–10)
CHLORIDE SERPL-SCNC: 106 MMOL/L (ref 98–107)
CHOLEST SERPL-MCNC: 183 MG/DL
CHOLEST/HDLC SERPL: 5 {RATIO} (ref 0–5)
CO2 SERPL-SCNC: 26 MMOL/L (ref 23–31)
CREAT SERPL-MCNC: 0.82 MG/DL (ref 0.73–1.18)
GLOBULIN SER-MCNC: 2.9 GM/DL (ref 2.4–3.5)
GLUCOSE SERPL-MCNC: 96 MG/DL (ref 82–115)
HDLC SERPL-MCNC: 38 MG/DL (ref 35–60)
LDLC SERPL CALC-MCNC: 106 MG/DL (ref 50–140)
POTASSIUM SERPL-SCNC: 4.3 MMOL/L (ref 3.5–5.1)
PROT SERPL-MCNC: 6.7 GM/DL (ref 5.8–7.6)
SODIUM SERPL-SCNC: 141 MMOL/L (ref 136–145)
TRIGL SERPL-MCNC: 193 MG/DL (ref 34–140)
VLDLC SERPL CALC-MCNC: 39 MG/DL

## 2022-07-12 PROCEDURE — 36415 COLL VENOUS BLD VENIPUNCTURE: CPT

## 2022-07-12 PROCEDURE — 80061 LIPID PANEL: CPT

## 2022-07-12 PROCEDURE — 80053 COMPREHEN METABOLIC PANEL: CPT

## 2022-08-12 NOTE — PROGRESS NOTES
Renuka Adams MD   [address] [place] [address]     PATIENT NAME: Marquis Quintero  : 1940  DATE: 8/15/22  MRN: [id number]      Billing Provider: Renuka Adams MD  Level of Service:   Patient PCP Information     Provider PCP Type    Renuka Adams MD General          Reason for Visit / Chief Complaint: No chief complaint on file.       Update PCP  Update Chief Complaint         History of Present Illness / Problem Focused Workflow     Marquis Quintero presents to the clinic with No chief complaint on file.     Here for follow up, he needed a stent with his heart doctor. He was to do knee and colon, he was told he has to wait with the stent, he needs to stay on medication. He saw skin doctor, it is swelling there near the left eye and in morning it is almost closed. He had gone out and sprayed his grass. His breathing is pretty good now, he's just noticed it when he rushes or tries to sit down. He is feeling better. He is usually wearing his compression stockings but today he could not get the tan ones on, the black usually go on easily.       Review of Systems     Review of Systems   Constitutional: Negative for unexpected weight change.   HENT: Positive for hearing loss.    Eyes: Negative.    Respiratory: Positive for shortness of breath. Negative for cough and wheezing.    Cardiovascular: Positive for leg swelling. Negative for chest pain and palpitations.   Gastrointestinal: Positive for constipation and diarrhea.        Thinks it depends on his diet   Endocrine: Negative.    Genitourinary:        Up to date with Dr Umanzor   Hematological: Negative.    Psychiatric/Behavioral: Negative.        Medical / Social / Family History     Past Medical History:   Diagnosis Date    Aortic regurgitation     Carotid artery stenosis     HTN (hypertension)     Internal hemorrhage     Mitral regurgitation     Personal history of colonic polyps     Skin cancer     Unspecified glaucoma        Past Surgical History:    Procedure Laterality Date    COLONOSCOPY      CYSTOSCOPY      EXTRACTION OF CATARACT      PROSTATE BIOPSY         Social History  Mr. Quintero      reports that he has never smoked. He has never used smokeless tobacco. He reports that he does not drink alcohol and does not use drugs.    Family History  Mr.'s Quintero   family history includes Alzheimer's disease in his father; Colon cancer in his mother; Heart disease in his mother.    Medications and Allergies     Medications  No outpatient medications have been marked as taking for the 8/15/22 encounter (Appointment) with Renuka Adams MD.       Allergies  Review of patient's allergies indicates:  No Known Allergies    Physical Examination   There were no vitals filed for this visit.  Physical Exam  Vitals reviewed.   Constitutional:       Appearance: He is obese.   Eyes:      Extraocular Movements: Extraocular movements intact.      Conjunctiva/sclera: Conjunctivae normal.   Cardiovascular:      Rate and Rhythm: Normal rate and regular rhythm.      Heart sounds:     Gallop present.      Comments: 2+ edema  Pulmonary:      Effort: No respiratory distress.      Breath sounds: Rhonchi present. No wheezing or rales.   Abdominal:      General: Abdomen is flat. There is distension.      Tenderness: There is no abdominal tenderness. There is no guarding.   Musculoskeletal:         General: No tenderness.      Right lower leg: Edema present.      Left lower leg: Edema present.   Skin:     General: Skin is warm and dry.   Neurological:      Mental Status: He is alert.   Psychiatric:         Mood and Affect: Mood normal.         Thought Content: Thought content normal.           Assessment and Plan (including Health Maintenance)      Problem List  Smart Sets  Document Outside HM   :  Lab Visit on 07/12/2022   Component Date Value    Sodium Level 07/12/2022 141     Potassium Level 07/12/2022 4.3     Chloride 07/12/2022 106     Carbon Dioxide 07/12/2022 26      Glucose Level 07/12/2022 96     Blood Urea Nitrogen 07/12/2022 16.6     Creatinine 07/12/2022 0.82     Calcium Level Total 07/12/2022 9.2     Protein Total 07/12/2022 6.7     Albumin Level 07/12/2022 3.8     Globulin 07/12/2022 2.9     Albumin/Globulin Ratio 07/12/2022 1.3     Bilirubin Total 07/12/2022 1.0     Alkaline Phosphatase 07/12/2022 68     Alanine Aminotransferase 07/12/2022 10     Aspartate Aminotransfera* 07/12/2022 18     Estimated GFR-Non Angie* 07/12/2022 >60     Cholesterol Total 07/12/2022 183     HDL Cholesterol 07/12/2022 38     Triglyceride 07/12/2022 193 (A)    Cholesterol/HDL Ratio 07/12/2022 5     Very Low Density Lipopro* 07/12/2022 39     LDL Cholesterol 07/12/2022 106.00            Plan:   Add Muprocin to area with cryotherapy  Continue working on weight, it's just slow.  Follow up with Dr Rich for when it is ok to do any elective procedures.       Health Maintenance Due   Topic Date Due    TETANUS VACCINE  10/31/2012    Shingles Vaccine (1 of 2) 01/03/2017    COVID-19 Vaccine (4 - Booster for Moderna series) 06/02/2022       Problem List Items Addressed This Visit        ENT    Hearing loss       Cardiac/Vascular    Atherosclerosis of coronary artery    Benign hypertension - Primary       Oncology    Adenocarcinoma of prostate          Health Maintenance Topics with due status: Not Due       Topic Last Completion Date    Influenza Vaccine 10/08/2021    Aspirin/Antiplatelet Therapy 05/09/2022    Lipid Panel 07/12/2022       Future Appointments   Date Time Provider Department Center   8/15/2022  9:40 AM Renuka Adams MD [facility] PCP            Signature:  Renuka Adams MD  [facility]  [address] [place] [address]    Date of encounter: 8/15/22

## 2022-08-15 ENCOUNTER — OFFICE VISIT (OUTPATIENT)
Dept: PRIMARY CARE CLINIC | Facility: CLINIC | Age: 82
End: 2022-08-15
Payer: MEDICARE

## 2022-08-15 VITALS
WEIGHT: 225 LBS | DIASTOLIC BLOOD PRESSURE: 60 MMHG | HEART RATE: 60 BPM | BODY MASS INDEX: 35.31 KG/M2 | OXYGEN SATURATION: 94 % | RESPIRATION RATE: 16 BRPM | HEIGHT: 67 IN | TEMPERATURE: 97 F | SYSTOLIC BLOOD PRESSURE: 112 MMHG

## 2022-08-15 DIAGNOSIS — E66.01 SEVERE OBESITY (BMI 35.0-39.9) WITH COMORBIDITY: ICD-10-CM

## 2022-08-15 DIAGNOSIS — H83.3X3 NOISE-INDUCED HEARING LOSS OF BOTH EARS: ICD-10-CM

## 2022-08-15 DIAGNOSIS — I10 BENIGN HYPERTENSION: Primary | ICD-10-CM

## 2022-08-15 DIAGNOSIS — L98.9 SKIN LESION: ICD-10-CM

## 2022-08-15 DIAGNOSIS — I25.10 ATHEROSCLEROSIS OF NATIVE CORONARY ARTERY OF NATIVE HEART WITHOUT ANGINA PECTORIS: ICD-10-CM

## 2022-08-15 DIAGNOSIS — C61 ADENOCARCINOMA OF PROSTATE: ICD-10-CM

## 2022-08-15 PROCEDURE — 99213 PR OFFICE/OUTPT VISIT, EST, LEVL III, 20-29 MIN: ICD-10-PCS | Mod: ,,, | Performed by: INTERNAL MEDICINE

## 2022-08-15 PROCEDURE — 99213 OFFICE O/P EST LOW 20 MIN: CPT | Mod: ,,, | Performed by: INTERNAL MEDICINE

## 2022-08-15 RX ORDER — DORZOLAMIDE HYDROCHLORIDE AND TIMOLOL MALEATE PRESERVATIVE FREE 20; 5 MG/ML; MG/ML
1 SOLUTION/ DROPS OPHTHALMIC 2 TIMES DAILY
COMMUNITY
Start: 2022-08-01 | End: 2023-12-05 | Stop reason: ALTCHOICE

## 2022-08-15 RX ORDER — ROSUVASTATIN CALCIUM 10 MG/1
10 TABLET, COATED ORAL DAILY
COMMUNITY
Start: 2022-07-11 | End: 2023-02-27

## 2022-08-15 RX ORDER — MUPIROCIN 20 MG/G
OINTMENT TOPICAL 3 TIMES DAILY
Qty: 30 G | Refills: 1 | Status: SHIPPED | OUTPATIENT
Start: 2022-08-15 | End: 2023-08-30 | Stop reason: ALTCHOICE

## 2022-08-15 RX ORDER — METHAZOLAMIDE 50 MG/1
50 TABLET ORAL DAILY
COMMUNITY
Start: 2022-07-11 | End: 2023-12-05 | Stop reason: ALTCHOICE

## 2022-09-26 ENCOUNTER — DOCUMENTATION ONLY (OUTPATIENT)
Dept: PRIMARY CARE CLINIC | Facility: CLINIC | Age: 82
End: 2022-09-26
Payer: MEDICARE

## 2022-09-26 RX ORDER — POTASSIUM CHLORIDE 1500 MG/1
20 TABLET, EXTENDED RELEASE ORAL
COMMUNITY
Start: 2022-04-20 | End: 2023-04-19 | Stop reason: SDUPTHER

## 2022-11-10 ENCOUNTER — TELEPHONE (OUTPATIENT)
Dept: PRIMARY CARE CLINIC | Facility: CLINIC | Age: 82
End: 2022-11-10
Payer: MEDICARE

## 2022-11-17 ENCOUNTER — OFFICE VISIT (OUTPATIENT)
Dept: PRIMARY CARE CLINIC | Facility: CLINIC | Age: 82
End: 2022-11-17
Payer: MEDICARE

## 2022-11-17 VITALS
TEMPERATURE: 97 F | DIASTOLIC BLOOD PRESSURE: 75 MMHG | WEIGHT: 222 LBS | RESPIRATION RATE: 18 BRPM | BODY MASS INDEX: 34.77 KG/M2 | HEART RATE: 48 BPM | SYSTOLIC BLOOD PRESSURE: 158 MMHG | OXYGEN SATURATION: 97 %

## 2022-11-17 DIAGNOSIS — I10 UNCONTROLLED HYPERTENSION: Primary | ICD-10-CM

## 2022-11-17 DIAGNOSIS — N63.22 MASS OF UPPER INNER QUADRANT OF LEFT BREAST: ICD-10-CM

## 2022-11-17 DIAGNOSIS — I25.10 ATHEROSCLEROSIS OF NATIVE CORONARY ARTERY OF NATIVE HEART WITHOUT ANGINA PECTORIS: ICD-10-CM

## 2022-11-17 DIAGNOSIS — C61 ADENOCARCINOMA OF PROSTATE: ICD-10-CM

## 2022-11-17 PROCEDURE — 99213 PR OFFICE/OUTPT VISIT, EST, LEVL III, 20-29 MIN: ICD-10-PCS | Mod: ,,, | Performed by: INTERNAL MEDICINE

## 2022-11-17 PROCEDURE — 99213 OFFICE O/P EST LOW 20 MIN: CPT | Mod: ,,, | Performed by: INTERNAL MEDICINE

## 2022-11-29 ENCOUNTER — TELEPHONE (OUTPATIENT)
Dept: PRIMARY CARE CLINIC | Facility: CLINIC | Age: 82
End: 2022-11-29
Payer: MEDICARE

## 2022-11-29 DIAGNOSIS — N63.0 BREAST NODULE: Primary | ICD-10-CM

## 2022-11-29 NOTE — TELEPHONE ENCOUNTER
----- Message from Mariluz Leon sent at 11/29/2022  9:17 AM CST -----  Regarding: General Message  General Message      [facility] called requesting an order to be placed in the computer for an Isaac Diag Breast Mammogram, patient isn't scheduled yet for testing waiting on an order.  They will be able to get the order out of Epic System. Thanks

## 2023-01-17 ENCOUNTER — LAB VISIT (OUTPATIENT)
Dept: LAB | Facility: HOSPITAL | Age: 83
End: 2023-01-17
Attending: INTERNAL MEDICINE
Payer: MEDICARE

## 2023-01-17 DIAGNOSIS — Z79.899 ENCOUNTER FOR LONG-TERM (CURRENT) USE OF OTHER MEDICATIONS: ICD-10-CM

## 2023-01-17 DIAGNOSIS — E78.5 HYPERLIPIDEMIA, UNSPECIFIED HYPERLIPIDEMIA TYPE: ICD-10-CM

## 2023-01-17 DIAGNOSIS — I10 ESSENTIAL HYPERTENSION, MALIGNANT: Primary | ICD-10-CM

## 2023-01-17 LAB
ALBUMIN SERPL-MCNC: 3.8 G/DL (ref 3.4–4.8)
ALBUMIN/GLOB SERPL: 1.5 RATIO (ref 1.1–2)
ALP SERPL-CCNC: 73 UNIT/L (ref 40–150)
ALT SERPL-CCNC: 6 UNIT/L (ref 0–55)
AST SERPL-CCNC: 17 UNIT/L (ref 5–34)
BILIRUBIN DIRECT+TOT PNL SERPL-MCNC: 0.7 MG/DL
BUN SERPL-MCNC: 18.4 MG/DL (ref 8.4–25.7)
CALCIUM SERPL-MCNC: 9 MG/DL (ref 8.8–10)
CHLORIDE SERPL-SCNC: 104 MMOL/L (ref 98–107)
CHOLEST SERPL-MCNC: 144 MG/DL
CHOLEST/HDLC SERPL: 4 {RATIO} (ref 0–5)
CO2 SERPL-SCNC: 30 MMOL/L (ref 23–31)
CREAT SERPL-MCNC: 0.99 MG/DL (ref 0.73–1.18)
GFR SERPLBLD CREATININE-BSD FMLA CKD-EPI: >60 MLS/MIN/1.73/M2
GLOBULIN SER-MCNC: 2.6 GM/DL (ref 2.4–3.5)
GLUCOSE SERPL-MCNC: 97 MG/DL (ref 82–115)
HDLC SERPL-MCNC: 39 MG/DL (ref 35–60)
LDLC SERPL CALC-MCNC: 83 MG/DL (ref 50–140)
POTASSIUM SERPL-SCNC: 3.9 MMOL/L (ref 3.5–5.1)
PROT SERPL-MCNC: 6.4 GM/DL (ref 5.8–7.6)
SODIUM SERPL-SCNC: 143 MMOL/L (ref 136–145)
TRIGL SERPL-MCNC: 109 MG/DL (ref 34–140)
VLDLC SERPL CALC-MCNC: 22 MG/DL

## 2023-01-17 PROCEDURE — 36415 COLL VENOUS BLD VENIPUNCTURE: CPT

## 2023-01-17 PROCEDURE — 80053 COMPREHEN METABOLIC PANEL: CPT

## 2023-01-17 PROCEDURE — 80061 LIPID PANEL: CPT

## 2023-02-10 ENCOUNTER — TELEPHONE (OUTPATIENT)
Dept: PRIMARY CARE CLINIC | Facility: CLINIC | Age: 83
End: 2023-02-10

## 2023-02-10 PROBLEM — I20.0 UNSTABLE ANGINA: Status: ACTIVE | Noted: 2023-02-06

## 2023-02-10 PROBLEM — T82.897A OCCLUDED CORONARY ARTERY STENT: Status: ACTIVE | Noted: 2023-02-10

## 2023-02-27 ENCOUNTER — OFFICE VISIT (OUTPATIENT)
Dept: PRIMARY CARE CLINIC | Facility: CLINIC | Age: 83
End: 2023-02-27
Payer: MEDICARE

## 2023-02-27 ENCOUNTER — TELEPHONE (OUTPATIENT)
Dept: PRIMARY CARE CLINIC | Facility: CLINIC | Age: 83
End: 2023-02-27

## 2023-02-27 VITALS
HEIGHT: 67 IN | OXYGEN SATURATION: 94 % | BODY MASS INDEX: 34.53 KG/M2 | WEIGHT: 220 LBS | RESPIRATION RATE: 16 BRPM | DIASTOLIC BLOOD PRESSURE: 71 MMHG | HEART RATE: 51 BPM | SYSTOLIC BLOOD PRESSURE: 146 MMHG | TEMPERATURE: 99 F

## 2023-02-27 DIAGNOSIS — D64.9 NORMOCHROMIC ANEMIA: ICD-10-CM

## 2023-02-27 DIAGNOSIS — I10 UNCONTROLLED HYPERTENSION: Primary | ICD-10-CM

## 2023-02-27 DIAGNOSIS — R73.9 HYPERGLYCEMIA: ICD-10-CM

## 2023-02-27 DIAGNOSIS — I25.700 ATHEROSCLEROSIS OF CORONARY ARTERY BYPASS GRAFT OF NATIVE HEART WITH UNSTABLE ANGINA PECTORIS: ICD-10-CM

## 2023-02-27 DIAGNOSIS — I50.1: ICD-10-CM

## 2023-02-27 LAB — HBA1C MFR BLD: 5.5 %

## 2023-02-27 PROCEDURE — 83036 POCT HEMOGLOBIN A1C: ICD-10-PCS | Mod: QW,,, | Performed by: INTERNAL MEDICINE

## 2023-02-27 PROCEDURE — 99213 PR OFFICE/OUTPT VISIT, EST, LEVL III, 20-29 MIN: ICD-10-PCS | Mod: ,,, | Performed by: INTERNAL MEDICINE

## 2023-02-27 PROCEDURE — 99213 OFFICE O/P EST LOW 20 MIN: CPT | Mod: ,,, | Performed by: INTERNAL MEDICINE

## 2023-02-27 PROCEDURE — 83036 HEMOGLOBIN GLYCOSYLATED A1C: CPT | Mod: QW,,, | Performed by: INTERNAL MEDICINE

## 2023-02-27 RX ORDER — ROSUVASTATIN CALCIUM 20 MG/1
20 TABLET, COATED ORAL DAILY
COMMUNITY
Start: 2023-02-15

## 2023-02-27 RX ORDER — METOPROLOL SUCCINATE 50 MG/1
1 TABLET, EXTENDED RELEASE ORAL EVERY MORNING
COMMUNITY
Start: 2023-02-08 | End: 2023-12-05 | Stop reason: ALTCHOICE

## 2023-02-27 RX ORDER — ISOSORBIDE MONONITRATE 30 MG/1
1 TABLET, EXTENDED RELEASE ORAL EVERY MORNING
COMMUNITY
Start: 2023-02-08 | End: 2024-02-08

## 2023-02-27 RX ORDER — FUROSEMIDE 40 MG/1
40 TABLET ORAL 2 TIMES DAILY
COMMUNITY
Start: 2023-02-08 | End: 2024-02-08

## 2023-02-27 NOTE — PROGRESS NOTES
Renuka Adams MD   [address] [place] [address]     Patient ID: [id number]     Chief Complaint: 3 month follow up for HTN        HPI:     Marquis Quintero is a 82 y.o. male here today for a follow up. He was hospitalized 2/6 to 2/8 with angina and had heart cath. He was SOB and had high BP, he had pain in back and jaw same as his first. On visit there his sugar was over 200 but no A1C was visible on Care Everywhere. He thinks it's more fluid and he wants to work on that. His breath was better but it's getting short again.     Subjective:     Review of Systems   Constitutional:         Weight is really 220   HENT:          Getting new hearing aide with [employer], temp isn't working well at staying in canal   Respiratory:  Positive for shortness of breath.    Cardiovascular:  Negative for chest pain.        Angina is better since in hospital   Endo/Heme/Allergies:         Checked sugar and he was 110 on wife's meter last week   Psychiatric/Behavioral: Negative.       ----------------------------  Aortic regurgitation  Carotid artery stenosis  HTN (hypertension)  Internal hemorrhage  Mitral regurgitation  NSTEMI (non-ST elevated myocardial infarction)      Comment:  [facility]  Personal history of colonic polyps  Skin cancer  Unspecified glaucoma     Past Surgical History:   Procedure Laterality Date    COLONOSCOPY      CYSTOSCOPY      EXTRACTION OF CATARACT      PROSTATE BIOPSY         Family History   Problem Relation Age of Onset    Colon cancer Mother     Heart disease Mother     Alzheimer's disease Father         Social History     Socioeconomic History    Marital status:    Tobacco Use    Smoking status: Never    Smokeless tobacco: Never   Substance and Sexual Activity    Alcohol use: Never    Drug use: Never    Sexual activity: Not Currently       Review of patient's allergies indicates:  No Known Allergies    Outpatient Medications Marked as Taking for the 2/27/23 encounter (Office Visit)  "with Renuka Adams MD   Medication Sig Dispense Refill    cycloSPORINE (RESTASIS) 0.05 % ophthalmic emulsion Apply 1 drop to eye 2 (two) times daily.      dorzolamide (TRUSOPT) 2 % ophthalmic solution Apply 1 drop to eye 3 (three) times daily.      dorzolamide-timolol, PF, (COSOPT PF) 2-0.5 % Dpet ophthalmic solution Place 1 drop into both eyes 2 (two) times daily.      furosemide (LASIX) 40 MG tablet Take 40 mg by mouth 2 (two) times a day.      hydrALAZINE (APRESOLINE) 50 MG tablet Take 50 mg by mouth 3 (three) times daily.      isosorbide mononitrate (IMDUR) 30 MG 24 hr tablet Take 1 tablet by mouth every morning.      losartan (COZAAR) 100 MG tablet TAKE 1 TABLET BY MOUTH ONCE DAILY AT  6  AM 90 tablet 0    metoprolol succinate (TOPROL-XL) 50 MG 24 hr tablet Take 1 tablet by mouth every morning.      mupirocin (BACTROBAN) 2 % ointment Apply topically 3 (three) times daily. 30 g 1    potassium chloride SA (K-DUR,KLOR-CON) 20 MEQ tablet Take 20 mEq by mouth once daily.      rosuvastatin (CRESTOR) 20 MG tablet Take 20 mg by mouth once daily.      tafluprost, PF, (ZIOPTAN, PF,) 0.0015 % Dpet Apply 1 drop to eye once daily at 6am.      timolol maleate 0.5% (TIMOPTIC) 0.5 % Drop Apply 1 drop to eye once daily at 6am.         Patient Care Team:  Renuka Adams MD as PCP - General (Internal Medicine)  Tristan Lozoya MD as Consulting Physician (Cardiology)  Giacomo Scott MD as Consulting Physician (Urology)  Treva Rubio (Inactive)  Marcell Gauthier MD as Consulting Physician (Colon and Rectal Surgery)  Juan Carlos Nuno MD as Consulting Physician (Otolaryngology)  Jude Cho MD as Consulting Physician (Gastroenterology)  Chris Nichols MD as Consulting Physician (Orthopedic Surgery)  Renuka Adams MD as Consulting Physician (Internal Medicine)       Objective:     BP (!) 146/71 (BP Location: Left arm, Patient Position: Sitting)   Pulse (!) 51   Temp 98.6 °F (37 °C)   Resp 16   Ht 5' 7" (1.702 m) "   Wt 93 kg (205 lb)   SpO2 (!) 94%   BMI 32.11 kg/m²     Physical Exam  Vitals reviewed.   Neck:      Comments: Venous pulsation  Cardiovascular:      Rate and Rhythm: Regular rhythm. Bradycardia present.      Heart sounds: Murmur heard.     No gallop.   Pulmonary:      Breath sounds: Rales present.      Comments: Faint rales left base  Skin:     General: Skin is warm and dry.      Findings: No bruising.   Neurological:      Mental Status: He is alert.         Assessment/Problems:       ICD-10-CM ICD-9-CM   1. Uncontrolled hypertension  I10 401.9   2. Hyperglycemia  R73.9 790.29   3. 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 Normochromic anemia  D64.9 285.9   5. Heart failure, acute, left-sided  I50.1 428.1        Plan:     1. Uncontrolled hypertension  Comments:  On increased fluid meds, he goes back to Dr Lozoya 10 days would increase if he isn't better by then    2. Hyperglycemia  Comments:  Need to check A1C  Orders:  -     Hemoglobin A1C, POCT    3. 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 Normochromic anemia  Comments:  At [facility]    5. Heart failure, acute, left-sided  Comments:  Dr Lozoya added more fluid medication             Follow up in about 3 months (around 5/27/2023) for uncontrolled HTN. In addition to their scheduled follow up, the patient has also been instructed to follow up on as needed basis.     Signature:  Renuka Adams MD  [facility]  [id number] [place] [address]

## 2023-03-31 DIAGNOSIS — I71.21 ASCENDING AORTIC ANEURYSM: Primary | ICD-10-CM

## 2023-04-04 ENCOUNTER — HOSPITAL ENCOUNTER (OUTPATIENT)
Dept: RADIOLOGY | Facility: HOSPITAL | Age: 83
Discharge: HOME | End: 2023-04-04
Attending: INTERNAL MEDICINE
Payer: MEDICARE

## 2023-04-04 DIAGNOSIS — I71.21 ASCENDING AORTIC ANEURYSM: ICD-10-CM

## 2023-04-04 PROBLEM — I71.23 ANEURYSM OF DESCENDING THORACIC AORTA WITHOUT RUPTURE: Status: ACTIVE | Noted: 2023-04-04

## 2023-04-04 PROCEDURE — 71260 CT THORAX DX C+: CPT | Mod: TC

## 2023-04-04 PROCEDURE — 25500020 PHARM REV CODE 255: Performed by: INTERNAL MEDICINE

## 2023-04-04 RX ADMIN — IOPAMIDOL 100 ML: 755 INJECTION, SOLUTION INTRAVENOUS at 10:04

## 2023-04-12 DIAGNOSIS — I71.20 ANEURYSM, AORTA, THORACIC: Primary | ICD-10-CM

## 2023-04-19 ENCOUNTER — OFFICE VISIT (OUTPATIENT)
Dept: PRIMARY CARE CLINIC | Facility: CLINIC | Age: 83
End: 2023-04-19
Payer: MEDICARE

## 2023-04-19 ENCOUNTER — HOSPITAL ENCOUNTER (OUTPATIENT)
Dept: RADIOLOGY | Facility: HOSPITAL | Age: 83
Discharge: HOME | End: 2023-04-19
Attending: INTERNAL MEDICINE
Payer: MEDICARE

## 2023-04-19 VITALS
OXYGEN SATURATION: 97 % | HEIGHT: 67 IN | RESPIRATION RATE: 16 BRPM | WEIGHT: 225 LBS | SYSTOLIC BLOOD PRESSURE: 152 MMHG | HEART RATE: 49 BPM | TEMPERATURE: 98 F | BODY MASS INDEX: 35.31 KG/M2 | DIASTOLIC BLOOD PRESSURE: 66 MMHG

## 2023-04-19 DIAGNOSIS — Z85.46 HISTORY OF PROSTATE CANCER: ICD-10-CM

## 2023-04-19 DIAGNOSIS — M54.16 RIGHT LUMBAR RADICULOPATHY: ICD-10-CM

## 2023-04-19 DIAGNOSIS — M17.11 PRIMARY OSTEOARTHRITIS OF RIGHT KNEE: ICD-10-CM

## 2023-04-19 DIAGNOSIS — M54.16 RIGHT LUMBAR RADICULOPATHY: Primary | ICD-10-CM

## 2023-04-19 PROCEDURE — 99214 OFFICE O/P EST MOD 30 MIN: CPT | Mod: ,,, | Performed by: INTERNAL MEDICINE

## 2023-04-19 PROCEDURE — 99214 PR OFFICE/OUTPT VISIT, EST, LEVL IV, 30-39 MIN: ICD-10-PCS | Mod: ,,, | Performed by: INTERNAL MEDICINE

## 2023-04-19 PROCEDURE — 73501 X-RAY EXAM HIP UNI 1 VIEW: CPT | Mod: TC,RT

## 2023-04-19 PROCEDURE — 72100 X-RAY EXAM L-S SPINE 2/3 VWS: CPT | Mod: TC

## 2023-04-19 RX ORDER — TAMSULOSIN HYDROCHLORIDE 0.4 MG/1
2 CAPSULE ORAL NIGHTLY
COMMUNITY

## 2023-04-19 RX ORDER — GABAPENTIN 100 MG/1
100 CAPSULE ORAL NIGHTLY
Qty: 30 CAPSULE | Refills: 5 | Status: SHIPPED | OUTPATIENT
Start: 2023-04-19 | End: 2023-08-30 | Stop reason: ALTCHOICE

## 2023-04-19 NOTE — PROGRESS NOTES
Renuka Adams MD   [address] [place] [address]     Patient ID: [id number]     Chief Complaint: Hip Pain (R hip/knee pain for a while )        HPI:     Marquis Quintero is a 82 y.o. male here today for hip pain. He notes it's been there a long time but it's getting worse, there is pain on the right leg. It hurts in the hip and knee. He feels it more in the night when he's laying down or bending to do something in the garden.  He knows the knee may need surgery but he has to wait on his stents so Devora said it would be a year.  He notes that he has pain in his SI region. The left leg is not giving any issues.       Subjective:     Review of Systems   Respiratory: Negative.     Cardiovascular:         Still sees Meri   Genitourinary:         Still sees Tyler   Musculoskeletal:  Positive for back pain, joint pain and myalgias. Negative for falls.        R knee pain, R hip pain, low back and SI pain.    Neurological:  Positive for tingling.     Past Medical History:   Diagnosis Date    Aortic regurgitation     Carotid artery stenosis     HTN (hypertension)     Internal hemorrhage     Mitral regurgitation     NSTEMI (non-ST elevated myocardial infarction) 02/07/2023    [facility]mission [facility]    Personal history of colonic polyps     Skin cancer     Unspecified glaucoma         Past Surgical History:   Procedure Laterality Date    COLONOSCOPY      CYSTOSCOPY      EXTRACTION OF CATARACT      PROSTATE BIOPSY         Family History   Problem Relation Age of Onset    Colon cancer Mother     Heart disease Mother     Alzheimer's disease Father         Social History     Socioeconomic History    Marital status:    Tobacco Use    Smoking status: Never    Smokeless tobacco: Never   Substance and Sexual Activity    Alcohol use: Never    Drug use: Never    Sexual activity: Not Currently     Social Determinants of Health     Financial Resource Strain: Low Risk     Difficulty of Paying Living Expenses: Not hard at all    Food Insecurity: No Food Insecurity    Worried About Running Out of Food in the Last Year: Never true    Ran Out of Food in the Last Year: Never true   Transportation Needs: No Transportation Needs    Lack of Transportation (Medical): No    Lack of Transportation (Non-Medical): No   Physical Activity: Insufficiently Active    Days of Exercise per Week: 2 days    Minutes of Exercise per Session: 30 min   Stress: No Stress Concern Present    Feeling of Stress : Only a little   Social Connections: Unknown    Frequency of Communication with Friends and Family: Three times a week    Frequency of Social Gatherings with Friends and Family: Once a week    Attends Judaism Services: Patient refused    Active Member of Clubs or Organizations: Patient refused    Attends Club or Organization Meetings: Patient refused    Marital Status:    Housing Stability: Low Risk     Unable to Pay for Housing in the Last Year: No    Number of Places Lived in the Last Year: 1    Unstable Housing in the Last Year: No       Review of patient's allergies indicates:  No Known Allergies    Outpatient Medications Marked as Taking for the 4/19/23 encounter (Office Visit) with Renuka Adams MD   Medication Sig Dispense Refill    ascorbic acid, vitamin C, (VITAMIN C) 500 MG tablet Take 500 mg by mouth.      aspirin (ECOTRIN) 325 MG EC tablet Take 1 tablet by mouth once daily.      cycloSPORINE (RESTASIS) 0.05 % ophthalmic emulsion Apply 1 drop to eye 2 (two) times daily.      dorzolamide (TRUSOPT) 2 % ophthalmic solution Apply 1 drop to eye 3 (three) times daily.      dorzolamide-timolol, PF, (COSOPT PF) 2-0.5 % Dpet ophthalmic solution Place 1 drop into both eyes 2 (two) times daily.      finasteride (PROSCAR) 5 mg tablet Take 5 mg by mouth once daily.      furosemide (LASIX) 40 MG tablet Take 40 mg by mouth 2 (two) times a day.      hydrALAZINE (APRESOLINE) 50 MG tablet Take 50 mg by mouth 3 (three) times daily.      isosorbide  "mononitrate (IMDUR) 30 MG 24 hr tablet Take 1 tablet by mouth every morning.      losartan (COZAAR) 100 MG tablet TAKE 1 TABLET BY MOUTH ONCE DAILY AT  6  AM 90 tablet 0    metoprolol succinate (TOPROL-XL) 50 MG 24 hr tablet Take 1 tablet by mouth every morning.      mupirocin (BACTROBAN) 2 % ointment Apply topically 3 (three) times daily. 30 g 1    potassium chloride SA (K-DUR,KLOR-CON) 20 MEQ tablet Take 20 mEq by mouth once daily.      rosuvastatin (CRESTOR) 20 MG tablet Take 20 mg by mouth once daily.      tafluprost, PF, (ZIOPTAN, PF,) 0.0015 % Dpet Apply 1 drop to eye once daily at 6am.      tamsulosin (FLOMAX) 0.4 mg Cap Take 2 capsules by mouth every evening.      timolol maleate 0.5% (TIMOPTIC) 0.5 % Drop Apply 1 drop to eye once daily at 6am.         Patient Care Team:  Renuka Adams MD as PCP - General (Internal Medicine)  Tristan Lozoya MD as Consulting Physician (Cardiology)  Giacomo Scott MD as Consulting Physician (Urology)  Treva Rubio (Inactive)  Marcell Gauthier MD as Consulting Physician (Colon and Rectal Surgery)  Juan Carlos Nuno MD as Consulting Physician (Otolaryngology)  Jude Cho MD as Consulting Physician (Gastroenterology)  Chris Nichols MD as Consulting Physician (Orthopedic Surgery)  Renuka Adams MD as Consulting Physician (Internal Medicine)       Objective:     BP (!) 152/66 (BP Location: Left arm, Patient Position: Sitting, BP Method: Large (Automatic))   Pulse (!) 49   Temp 98.1 °F (36.7 °C) (Oral)   Resp 16   Ht 5' 7" (1.702 m)   Wt 102.1 kg (225 lb)   SpO2 97%   BMI 35.24 kg/m²     Physical Exam  Vitals reviewed.   Constitutional:       Appearance: He is obese.   Cardiovascular:      Rate and Rhythm: Normal rate.   Pulmonary:      Effort: Pulmonary effort is normal.   Musculoskeletal:      Comments: Hip moves without crepitance he is splinting on the right with laying down for movmement   Skin:     General: Skin is warm and dry.      Comments: Mult " seb keratosis on forearms with solar damage   Neurological:      Mental Status: He is alert.      Comments: SLR positive at 30 degrees on the right, left is negative, strength is still good, sensation present to light touch.          Assessment/Problems:       ICD-10-CM ICD-9-CM   1. Right lumbar radiculopathy  M54.16 724.4   2. Primary osteoarthritis of right knee  M17.11 715.16   3. History of prostate cancer  Z85.46 V10.46        Plan:     1. Right lumbar radiculopathy  -     Ambulatory referral/consult to Physical/Occupational Therapy  -     Cancel: X-Ray Lumbar Spine 2 Or 3 Views; Future; Expected date: 04/19/2023  -     Cancel: X-Ray Hip 1 View Right (with Pelvis when performed); Future; Expected date: 04/19/2023  -     X-Ray Hip 1 View Right (with Pelvis when performed); Future; Expected date: 04/19/2023  -     X-Ray Lumbar Spine 2 Or 3 Views; Future; Expected date: 04/19/2023    2. Primary osteoarthritis of right knee  -     Cancel: X-Ray Hip 1 View Right (with Pelvis when performed); Future; Expected date: 04/19/2023  -     X-Ray Hip 1 View Right (with Pelvis when performed); Future; Expected date: 04/19/2023    3. History of prostate cancer  -     X-Ray Hip 1 View Right (with Pelvis when performed); Future; Expected date: 04/19/2023  -     X-Ray Lumbar Spine 2 Or 3 Views; Future; Expected date: 04/19/2023    Other orders  -     gabapentin (NEURONTIN) 100 MG capsule; Take 1 capsule (100 mg total) by mouth every evening.  Dispense: 30 capsule; Refill: 5             Follow up for visit as scheduled in May. In addition to their scheduled follow up, the patient has also been instructed to follow up on as needed basis.     Signature:  Renuka Adams MD  [facility]  [address] [place] [id number]

## 2023-04-20 RX ORDER — LOSARTAN POTASSIUM 100 MG/1
TABLET ORAL
Qty: 90 TABLET | Refills: 1 | Status: SHIPPED | OUTPATIENT
Start: 2023-04-20 | End: 2023-10-10

## 2023-04-21 ENCOUNTER — TELEPHONE (OUTPATIENT)
Dept: PRIMARY CARE CLINIC | Facility: CLINIC | Age: 83
End: 2023-04-21
Payer: MEDICARE

## 2023-04-21 NOTE — TELEPHONE ENCOUNTER
Results were given to patient and the patient verbalized understanding     Patient stated 2 physical therapist came in yesterday but nothing was told to him. He said they would call you.

## 2023-04-21 NOTE — TELEPHONE ENCOUNTER
----- Message from Renuka Adams MD sent at 4/20/2023  6:07 PM CDT -----  He has arthritis in the hip and spine, both are not where we'd push for surgery, did physical therapy get approved?

## 2023-04-25 ENCOUNTER — TELEPHONE (OUTPATIENT)
Dept: PRIMARY CARE CLINIC | Facility: CLINIC | Age: 83
End: 2023-04-25
Payer: MEDICARE

## 2023-04-25 NOTE — TELEPHONE ENCOUNTER
[facility] She tried to schedule the patient but he stated he have someone already coming to his home for physical therapy. She stated since he has someone coming in the home already, he will not be able to schedule with them.

## 2023-05-22 ENCOUNTER — TELEPHONE (OUTPATIENT)
Dept: PRIMARY CARE CLINIC | Facility: CLINIC | Age: 83
End: 2023-05-22
Payer: MEDICARE

## 2023-05-22 NOTE — TELEPHONE ENCOUNTER
Are there any outstanding task in patient chart?  N     2. Do we have outstanding/pending referrals?  N     3. Has the patient been seen in an ER, Urgent Care, or admitted since last visit?  N     4. Has patient seen any other healthcare providers since last visit?  Dr. Lozoya    5. Has patient had any blood work or xrays done since last visit?  N   6. Is the patient's pneumonia vaccine current?  Prevnar 13: 10/15/18  Pneumonia 20: n/a  Pneumonia 23: 10/19/07    7. When was the patient's colonoscopy?  N/a  8. When was the patient's last mamogram?  N/a  9. When was the patient's last cervical screening/PAP smear?  N/a  10. When was the patient's last bone scan?  N/a  11. When was the patient's last diabetic screening?  A1c: n/a  Micro: n/a  Eye Exam: n/a

## 2023-05-30 ENCOUNTER — OFFICE VISIT (OUTPATIENT)
Dept: PRIMARY CARE CLINIC | Facility: CLINIC | Age: 83
End: 2023-05-30
Payer: MEDICARE

## 2023-05-30 VITALS
OXYGEN SATURATION: 98 % | TEMPERATURE: 98 F | WEIGHT: 223 LBS | BODY MASS INDEX: 35 KG/M2 | DIASTOLIC BLOOD PRESSURE: 51 MMHG | HEIGHT: 67 IN | RESPIRATION RATE: 16 BRPM | SYSTOLIC BLOOD PRESSURE: 105 MMHG | HEART RATE: 54 BPM

## 2023-05-30 DIAGNOSIS — I71.23 ANEURYSM OF DESCENDING THORACIC AORTA WITHOUT RUPTURE: ICD-10-CM

## 2023-05-30 DIAGNOSIS — I10 BENIGN HYPERTENSION: ICD-10-CM

## 2023-05-30 DIAGNOSIS — E78.5 DYSLIPIDEMIA: ICD-10-CM

## 2023-05-30 DIAGNOSIS — Z00.00 MEDICARE ANNUAL WELLNESS VISIT, SUBSEQUENT: Primary | ICD-10-CM

## 2023-05-30 DIAGNOSIS — R06.00 DYSPNEA, UNSPECIFIED TYPE: ICD-10-CM

## 2023-05-30 DIAGNOSIS — I25.810 ATHEROSCLEROSIS OF CORONARY ARTERY BYPASS GRAFT OF NATIVE HEART WITHOUT ANGINA PECTORIS: ICD-10-CM

## 2023-05-30 DIAGNOSIS — C61 ADENOCARCINOMA OF PROSTATE: ICD-10-CM

## 2023-05-30 LAB
ANION GAP SERPL CALC-SCNC: 8 MEQ/L
BASOPHILS # BLD AUTO: 0.02 X10(3)/MCL
BASOPHILS NFR BLD AUTO: 0.3 %
BUN SERPL-MCNC: 18.7 MG/DL (ref 8.4–25.7)
CALCIUM SERPL-MCNC: 9.1 MG/DL (ref 8.8–10)
CHLORIDE SERPL-SCNC: 106 MMOL/L (ref 98–107)
CO2 SERPL-SCNC: 31 MMOL/L (ref 23–31)
CREAT SERPL-MCNC: 0.98 MG/DL (ref 0.73–1.18)
CREAT/UREA NIT SERPL: 19
EOSINOPHIL # BLD AUTO: 0.12 X10(3)/MCL (ref 0–0.9)
EOSINOPHIL NFR BLD AUTO: 1.6 %
ERYTHROCYTE [DISTWIDTH] IN BLOOD BY AUTOMATED COUNT: 12.9 % (ref 11.5–17)
GFR SERPLBLD CREATININE-BSD FMLA CKD-EPI: >60 MLS/MIN/1.73/M2
GLUCOSE SERPL-MCNC: 135 MG/DL (ref 82–115)
HCT VFR BLD AUTO: 43.7 % (ref 42–52)
HGB BLD-MCNC: 13.9 G/DL (ref 14–18)
IMM GRANULOCYTES # BLD AUTO: 0.02 X10(3)/MCL (ref 0–0.04)
IMM GRANULOCYTES NFR BLD AUTO: 0.3 %
LYMPHOCYTES # BLD AUTO: 1.1 X10(3)/MCL (ref 0.6–4.6)
LYMPHOCYTES NFR BLD AUTO: 14.9 %
MCH RBC QN AUTO: 29.7 PG (ref 27–31)
MCHC RBC AUTO-ENTMCNC: 31.8 G/DL (ref 33–36)
MCV RBC AUTO: 93.4 FL (ref 80–94)
MONOCYTES # BLD AUTO: 0.47 X10(3)/MCL (ref 0.1–1.3)
MONOCYTES NFR BLD AUTO: 6.4 %
NEUTROPHILS # BLD AUTO: 5.67 X10(3)/MCL (ref 2.1–9.2)
NEUTROPHILS NFR BLD AUTO: 76.5 %
PLATELET # BLD AUTO: 176 X10(3)/MCL (ref 130–400)
PMV BLD AUTO: 10.4 FL (ref 7.4–10.4)
POTASSIUM SERPL-SCNC: 4 MMOL/L (ref 3.5–5.1)
RBC # BLD AUTO: 4.68 X10(6)/MCL (ref 4.7–6.1)
SODIUM SERPL-SCNC: 145 MMOL/L (ref 136–145)
WBC # SPEC AUTO: 7.4 X10(3)/MCL (ref 4.5–11.5)

## 2023-05-30 PROCEDURE — 85025 COMPLETE CBC W/AUTO DIFF WBC: CPT | Performed by: INTERNAL MEDICINE

## 2023-05-30 PROCEDURE — 80048 BASIC METABOLIC PNL TOTAL CA: CPT | Performed by: INTERNAL MEDICINE

## 2023-05-30 PROCEDURE — 36415 COLL VENOUS BLD VENIPUNCTURE: CPT | Performed by: INTERNAL MEDICINE

## 2023-05-30 PROCEDURE — 36415 COLL VENOUS BLD VENIPUNCTURE: CPT | Mod: ,,, | Performed by: INTERNAL MEDICINE

## 2023-05-30 PROCEDURE — 36415 PR COLLECTION VENOUS BLOOD,VENIPUNCTURE: ICD-10-PCS | Mod: ,,, | Performed by: INTERNAL MEDICINE

## 2023-05-30 PROCEDURE — G0439 PPPS, SUBSEQ VISIT: HCPCS | Mod: ,,, | Performed by: INTERNAL MEDICINE

## 2023-05-30 PROCEDURE — G0439 PR MEDICARE ANNUAL WELLNESS SUBSEQUENT VISIT: ICD-10-PCS | Mod: ,,, | Performed by: INTERNAL MEDICINE

## 2023-05-30 RX ORDER — ASPIRIN 81 MG/1
81 TABLET ORAL DAILY
COMMUNITY

## 2023-05-30 NOTE — PROGRESS NOTES
Patient presents for lab draw. L AC, tolerated well. Labs sent to [facility]  1G,1P

## 2023-05-30 NOTE — PROGRESS NOTES
Patient ID: [id number]     Chief Complaint: Annual Exam      HPI:     Marquis Quintero is a 82 y.o. male here today for a Medicare Wellness.       Opioid Screening: Patient medication list reviewed, patient is not taking prescription opioids. Patient is not using additional opioids than prescribed. Patient is at low risk of substance abuse based on this opioid use history.       Past Medical History:   Diagnosis Date    Aortic regurgitation     Carotid artery stenosis     HTN (hypertension)     Internal hemorrhage     Mitral regurgitation     NSTEMI (non-ST elevated myocardial infarction) 02/07/2023    [facility]    Personal history of colonic polyps     Skin cancer     Unspecified glaucoma         Past Surgical History:   Procedure Laterality Date    COLONOSCOPY      CYSTOSCOPY      EXTRACTION OF CATARACT      PROSTATE BIOPSY         Review of patient's allergies indicates:  No Known Allergies    Outpatient Medications Marked as Taking for the 5/30/23 encounter (Office Visit) with Renuka Adams MD   Medication Sig Dispense Refill    ascorbic acid, vitamin C, (VITAMIN C) 500 MG tablet Take 500 mg by mouth.      aspirin (ECOTRIN) 81 MG EC tablet Take 81 mg by mouth once daily.      cycloSPORINE (RESTASIS) 0.05 % ophthalmic emulsion Apply 1 drop to eye 2 (two) times daily.      dorzolamide (TRUSOPT) 2 % ophthalmic solution Apply 1 drop to eye 3 (three) times daily.      dorzolamide-timolol, PF, (COSOPT PF) 2-0.5 % Dpet ophthalmic solution Place 1 drop into both eyes 2 (two) times daily.      furosemide (LASIX) 40 MG tablet Take 40 mg by mouth 2 (two) times a day.      gabapentin (NEURONTIN) 100 MG capsule Take 1 capsule (100 mg total) by mouth every evening. 30 capsule 5    hydrALAZINE (APRESOLINE) 50 MG tablet Take 50 mg by mouth 3 (three) times daily.      isosorbide mononitrate (IMDUR) 30 MG 24 hr tablet Take 1 tablet by mouth every morning.      losartan (COZAAR) 100 MG tablet TAKE 1 TABLET BY MOUTH  ONCE DAILY AT  6AM 90 tablet 1    metoprolol succinate (TOPROL-XL) 50 MG 24 hr tablet Take 1 tablet by mouth every morning.      potassium chloride SA (K-DUR,KLOR-CON) 20 MEQ tablet Take 20 mEq by mouth once daily.      rosuvastatin (CRESTOR) 20 MG tablet Take 20 mg by mouth once daily.      tafluprost, PF, (ZIOPTAN, PF,) 0.0015 % Dpet Apply 1 drop to eye once daily at 6am.      tamsulosin (FLOMAX) 0.4 mg Cap Take 2 capsules by mouth every evening.      timolol maleate 0.5% (TIMOPTIC) 0.5 % Drop Apply 1 drop to eye once daily at 6am.         Social History     Socioeconomic History    Marital status:    Tobacco Use    Smoking status: Never    Smokeless tobacco: Never   Substance and Sexual Activity    Alcohol use: Never    Drug use: Never    Sexual activity: Not Currently     Social Determinants of Health     Financial Resource Strain: Low Risk     Difficulty of Paying Living Expenses: Not hard at all   Food Insecurity: No Food Insecurity    Worried About Running Out of Food in the Last Year: Never true    Ran Out of Food in the Last Year: Never true   Transportation Needs: No Transportation Needs    Lack of Transportation (Medical): No    Lack of Transportation (Non-Medical): No   Physical Activity: Insufficiently Active    Days of Exercise per Week: 2 days    Minutes of Exercise per Session: 30 min   Stress: No Stress Concern Present    Feeling of Stress : Only a little   Social Connections: Unknown    Frequency of Communication with Friends and Family: Three times a week    Frequency of Social Gatherings with Friends and Family: Once a week    Attends [employer] Services: Patient refused    Active Member of Clubs or Organizations: Patient refused    Attends Club or Organization Meetings: Patient refused    Marital Status:    Housing Stability: Low Risk     Unable to Pay for Housing in the Last Year: No    Number of Places Lived in the Last Year: 1    Unstable Housing in the Last Year: No         Family History   Problem Relation Age of Onset    Colon cancer Mother     Heart disease Mother     Alzheimer's disease Father         Patient Care Team:  Renuka Adams MD as PCP - General (Internal Medicine)  Tristan Lozoya MD as Consulting Physician (Cardiology)  Giacomo Scott MD as Consulting Physician (Urology)  Treva Rubio (Inactive)  Marcell Gauthier MD as Consulting Physician (Colon and Rectal Surgery)  Juan Carlos Nuno MD as Consulting Physician (Otolaryngology)  Jude Cho MD as Consulting Physician (Gastroenterology)  Chris Nichols MD as Consulting Physician (Orthopedic Surgery)  Renuka Adams MD as Consulting Physician (Internal Medicine)       Subjective:     Review of Systems   Constitutional:  Negative for malaise/fatigue.   HENT: Negative.     Respiratory:  Positive for shortness of breath.         At times with walking far or bending down   Cardiovascular:  Positive for leg swelling. Negative for chest pain and palpitations.        He sees Devora in June.    Gastrointestinal:  Positive for heartburn.        Belching with certain food   Musculoskeletal:  Positive for back pain.        Has trouble getting to his right foot to put on compression stocking with his back, he can't bend that far.    Neurological:  Positive for dizziness and weakness.        He is dizzy when he gets up and then he will get really bad at times.    Psychiatric/Behavioral: Negative.         Patient Reported Health Risk Assessment  What is your age?: 82 or older  Are you male or female?: Male  During the past four weeks, how much have you been bothered by emotional problems such as feeling anxious, depressed, irritable, sad, or downhearted and blue?: Not at all  During the past five weeks, has your physical and/or emotional health limited your social activities with family, friends, neighbors, or groups?: Not at all  During the past four weeks, how much bodily pain have you generally had?: Mild  pain  During the past four weeks, was someone available to help if you needed and wanted help?: Yes, as much as I wanted  During the past four weeks, what was the hardest physical activity you could do for at least two minutes?: Moderate  Can you get to places out of walking distance without help?  (For example, can you travel alone on buses or taxis, or drive your own car?): Yes  Can you go shopping for groceries or clothes without someone's help?: Yes  Can you prepare your own meals?: Yes  Can you do your own housework without help?: Yes  Because of any health problems, do you need the help of another person with your personal care needs such as eating, bathing, dressing, or getting around the house?: No  Can you handle your own money without help?: Yes  During the past four weeks, how would you rate your health in general?: Good  How have things been going for you during the past four weeks?: Pretty well  Are you having difficulties driving your car?: No  Do you always fasten your seat belt when you are in a car?: Yes, usually  How often in the past four weeks have you been bothered by falling or dizzy when standing up?: Never  How often in the past four weeks have you been bothered by sexual problems?: Never  How often in the past four weeks have you been bothered by trouble eating well?: Never  How often in the past four weeks have you been bothered by teeth or denture problems?: Never  How often in the past four weeks have you been bothered with problems using the telephone?: Never  How often in the past four weeks have you been bothered by tiredness or fatigue?: Never  Have you fallen two or more times in the past year?: No  Are you afraid of falling?: No  Are you a smoker?: No  During the past four weeks, how many drinks of wine, beer, or other alcoholic beverages did you have?: No alcohol at all  Do you exercise for about 20 minutes three or more days a week?: Yes, some of the time  Have you been given any  "information to help you with hazards in your house that might hurt you?: Yes  Have you been given any information to help you with keeping track of your medications?: Yes  How often do you have trouble taking medicines the way you've been told to take them?: I always take them as prescribed  How confident are you that you can control and manage most of your health problems?: Very confident  What is your race? (Check all that apply.):     Objective:     BP (!) 105/51 (BP Location: Left arm, Patient Position: Sitting, BP Method: Large (Automatic))   Pulse (!) 54   Temp 98.2 °F (36.8 °C) (Oral)   Resp 16   Ht 5' 7" (1.702 m)   Wt 101.2 kg (223 lb)   SpO2 98%   BMI 34.93 kg/m²     Physical Exam  Vitals reviewed.   Constitutional:       Appearance: He is obese.   HENT:      Head: Normocephalic and atraumatic.      Right Ear: Tympanic membrane, ear canal and external ear normal.      Left Ear: Tympanic membrane, ear canal and external ear normal.      Ears:      Comments: Some trouble getting hearing aides in and out     Mouth/Throat:      Mouth: Mucous membranes are moist.      Pharynx: No posterior oropharyngeal erythema.   Eyes:      Comments: Injected bilaterally over medial conjunctiva   Cardiovascular:      Heart sounds: Murmur heard.     Gallop present.   Pulmonary:      Breath sounds: No wheezing, rhonchi or rales.   Abdominal:      General: Bowel sounds are normal.      Palpations: Abdomen is soft.      Tenderness: There is no abdominal tenderness.   Musculoskeletal:         General: Tenderness present.      Cervical back: No tenderness.      Right lower leg: Edema present.      Left lower leg: Edema present.   Lymphadenopathy:      Cervical: No cervical adenopathy.   Neurological:      Mental Status: He is alert and oriented to person, place, and time. Mental status is at baseline.      Motor: No weakness.      Gait: Gait abnormal.   Psychiatric:         Mood and Affect: Mood normal.         " Behavior: Behavior normal.         Thought Content: Thought content normal.         Judgment: Judgment normal.         No flowsheet data found.  Fall Risk Assessment - Outpatient 5/30/2023 4/19/2023 2/27/2023 11/17/2022 8/15/2022 5/9/2022   Mobility Status Ambulatory Ambulatory Ambulatory - - Ambulatory   Number of falls 0 0 0 0 0 0   Identified as fall risk 0 0 0 0 0 0           Depression Screening  Over the past two weeks, has the patient felt down, depressed, or hopeless?: No  Over the past two weeks, has the patient felt little interest or pleasure in doing things?: No  Functional Ability/Safety Screening  Was the patient's timed Up & Go test unsteady or longer than 30 seconds?: No  Does the patient need help with phone, transportation, shopping, preparing meals, housework, laundry, meds, or managing money?: No  Does the patient's home have rugs in the hallway, lack grab bars in the bathroom, lack handrails on the stairs or have poor lighting?: No  Have you noticed any hearing difficulties?: No  Cognitive Function (Assessed through direct observation with due consideration of information obtained by way of patient reports and/or concerns raised by family, friends, caretakers, or others)    Does the patient repeat questions/statements in the same day?: No  Does the patient have trouble remembering the date, year, and time?: No  Does the patient have difficulty managing finances?: No  Does the patient have a decreased sense of direction?: No    Office Visit on 02/27/2023   Component Date Value    Hemoglobin A1C, POC 02/27/2023 5.5    Lab Visit on 01/17/2023   Component Date Value    Sodium Level 01/17/2023 143     Potassium Level 01/17/2023 3.9     Chloride 01/17/2023 104     Carbon Dioxide 01/17/2023 30     Glucose Level 01/17/2023 97     Blood Urea Nitrogen 01/17/2023 18.4     Creatinine 01/17/2023 0.99     Calcium Level Total 01/17/2023 9.0     Protein Total 01/17/2023 6.4     Albumin Level 01/17/2023 3.8      Globulin 01/17/2023 2.6     Albumin/Globulin Ratio 01/17/2023 1.5     Bilirubin Total 01/17/2023 0.7     Alkaline Phosphatase 01/17/2023 73     Alanine Aminotransferase 01/17/2023 6     Aspartate Aminotransfera* 01/17/2023 17     eGFR 01/17/2023 >60     Cholesterol Total 01/17/2023 144     HDL Cholesterol 01/17/2023 39     Triglyceride 01/17/2023 109     Cholesterol/HDL Ratio 01/17/2023 4     Very Low Density Lipopro* 01/17/2023 22     LDL Cholesterol 01/17/2023 83.00        Assessment/Plan:     1. Medicare annual wellness visit, subsequent    2. Benign hypertension  Comments:  High today but having dizziness with standing suggesting orthostatic hypotension, will have him drink more water and watch  Orders:  -     Basic Metabolic Panel    3. Atherosclerosis of coronary artery bypass graft of native heart without angina pectoris  Comments:  No chest pain, is still having SOB with exertion but likely failure, not angina    4. Adenocarcinoma of prostate  Comments:  Levels are good with Dr Scott.     5. Aneurysm of descending thoracic aorta without rupture  Comments:  Compliant with testing, sees Devora next month    6. Dyspnea, unspecified type  Comments:  Will check blood count, check he isn't becoming a little anemic.  Orders:  -     CBC Auto Differential    7. Dyslipidemia  Comments:  Continue [employer] Medicare Annual Wellness and Personalized Prevention Plan:   Fall Risk + Home Safety + Hearing Impairment + Depression Screen + Opioid and Substance Abuse Screening + Cognitive Impairment Screen + Health Risk Assessment all reviewed.     Health Maintenance Topics with due status: Not Due       Topic Last Completion Date    Lipid Panel 01/17/2023    Aspirin/Antiplatelet Therapy 05/30/2023      The patient's Health Maintenance was reviewed and the following appears to be due at this time:   Health Maintenance Due   Topic Date Due    TETANUS VACCINE  10/31/2012    Shingles Vaccine (1 of 2) 01/03/2017     COVID-19 Vaccine (4 - Moderna series) 03/30/2022       Advance Care Planning   I attest to discussing Advance Care Planning with patient and/or family member.  Education was provided including the importance of the Health Care Power of , Advance Directives, and/or [place] documentation.  The patient expressed understanding to the importance of this information and discussion.     Advance Care Planning     Date: 05/30/2023  He has not done a living will or advanced directive, his kids do know what he would want.          Follow up in about 3 months (around 8/30/2023) for uncontrolled HTN. In addition to their scheduled follow up, the patient has also been instructed to follow up on as needed basis.

## 2023-05-31 ENCOUNTER — DOCUMENTATION ONLY (OUTPATIENT)
Dept: PRIMARY CARE CLINIC | Facility: CLINIC | Age: 83
End: 2023-05-31
Payer: MEDICARE

## 2023-08-18 PROCEDURE — G0179 MD RECERTIFICATION HHA PT: HCPCS | Mod: ,,, | Performed by: INTERNAL MEDICINE

## 2023-08-18 PROCEDURE — G0179 PR HOME HEALTH MD RECERTIFICATION: ICD-10-PCS | Mod: ,,, | Performed by: INTERNAL MEDICINE

## 2023-08-21 ENCOUNTER — EXTERNAL HOME HEALTH (OUTPATIENT)
Dept: HOME HEALTH SERVICES | Facility: HOSPITAL | Age: 83
End: 2023-08-21
Payer: MEDICARE

## 2023-08-23 ENCOUNTER — TELEPHONE (OUTPATIENT)
Dept: PRIMARY CARE CLINIC | Facility: CLINIC | Age: 83
End: 2023-08-23
Payer: MEDICARE

## 2023-08-23 NOTE — LETTER
AUTHORIZATION FOR RELEASE OF   CONFIDENTIAL INFORMATION    Dear Dr Cast,    We are seeing Marquis Quintero, date of birth 1940, in the clinic at [facility]. Renuka Adams MD is the patient's PCP. Marquis Quintero has an outstanding lab/procedure at the time we reviewed his chart. In order to help keep his health information updated, he has authorized us to request the following medical record(s):        (  )  MAMMOGRAM                                      (  )  COLONOSCOPY      (  )  PAP SMEAR                                          (  )  OUTSIDE LAB RESULTS     (  )  DEXA SCAN                                          (X)  EYE EXAM            (  )  FOOT EXAM                                          (  )  ENTIRE RECORD     (  )  OUTSIDE IMMUNIZATIONS                 (  )  _______________         Please fax records to [facility], [place], Debra A, MD, [phone]     If you have any questions, please contact us at [phone].           Patient Name: Marquis Quintero  : 1940  Patient Phone #: [phone]

## 2023-08-23 NOTE — LETTER
AUTHORIZATION FOR RELEASE OF   CONFIDENTIAL INFORMATION    Dear Dr Scott,    We are seeing Marquis Quintero, date of birth 1940, in the clinic at [facility]. Renuka Adams MD is the patient's PCP. Marquis Quintero has an outstanding lab/procedure at the time we reviewed his chart. In order to help keep his health information updated, he has authorized us to request the following medical record(s):        (  )  MAMMOGRAM                                      (  )  COLONOSCOPY      (  )  PAP SMEAR                                          (  )  OUTSIDE LAB RESULTS     (  )  DEXA SCAN                                          (  )  EYE EXAM            (  )  FOOT EXAM                                          (  )  ENTIRE RECORD     (  )  OUTSIDE IMMUNIZATIONS                 (X)  LAST OV & LABS         Please fax records to [facility], [place], Debra A, MD, [phone]     If you have any questions, please contact us at [phone].           Patient Name: Marquis Quintero  : 1940  Patient Phone #: [phone]

## 2023-08-23 NOTE — TELEPHONE ENCOUNTER
1. Are there any outstanding tasks in patient's chart? (Ex. Labs, MMG)? NO    2. Do we have any outstanding/Pending referrals?NO    3. Has the patient been seen in an ER, Urgent Care or been admitted to the hospital since last office visit with our office?NO     4. Has the patient seen any other health care provider (doctors) since last visit? Segun Scott-CELINE's sent     5. Has the patient had any blood work or x-rays done since last visit? NO    FOR CLINICAL USE ONLY (DO NOT ASK PATIENT)    6. Is the patients pneumonia vaccine current?  Prevnar 13:10/15/23  Pneumonia 20:-  Pneumovax 23:10/19/07    7. When was the patient's Colonoscopy?-    8. When was the patient's last Mammogram?-    9.When was the patients last cervical screening/PAP smear?-    10. When was the patient's last bone scan?-    11. When was the patient's last diabetic screening?  A1C:2/27/23  [facility]:-  Eye Exam: -

## 2023-08-29 NOTE — PROGRESS NOTES
Renuka Adams MD   [address] [place] [address]     Patient ID: [id number]     Chief Complaint: Follow up for HTN and Dizziness        HPI:     Marquis Quintero is a 83 y.o. male here today for a follow up of HTN, CAD, and DJD. He notes he gets a little dizzy when he first gets up but usually it gets bad when he gets out and starts walking or doing things. He thinks he is drinking enough fluids for the hot weather.       Subjective:     Review of Systems   HENT:  Positive for hearing loss.         Had hearing aides dried and they are working really well now   Respiratory:  Positive for shortness of breath.         When he bends over he gets winded   Cardiovascular:  Positive for leg swelling. Negative for chest pain.   Genitourinary:         Uses adult diapers as he will leak. He got a rash, Dr Scott gave him a cream and it improved.    Musculoskeletal:  Positive for joint pain and myalgias.        Has to wait a year to do anything about the hip pain per [facility]   Skin:  Positive for rash.        In groin, resolved with Lotrisone.    Neurological:  Positive for dizziness and tingling.        If he crosses his ankles his foot or ankle tingles.    Psychiatric/Behavioral: Negative.         Past Medical History:   Diagnosis Date    Aortic regurgitation     Carotid artery stenosis     HTN (hypertension)     Internal hemorrhage     Mitral regurgitation     NSTEMI (non-ST elevated myocardial infarction) 02/07/2023    [facility]    Personal history of colonic polyps     Skin cancer     Unspecified glaucoma         Past Surgical History:   Procedure Laterality Date    COLONOSCOPY      CYSTOSCOPY      EXTRACTION OF CATARACT      PROSTATE BIOPSY         Family History   Problem Relation Age of Onset    Colon cancer Mother     Heart disease Mother     Alzheimer's disease Father         Social History     Socioeconomic History    Marital status:    Tobacco Use    Smoking status: Never    Smokeless tobacco:  Never   Substance and Sexual Activity    Alcohol use: Never    Drug use: Never    Sexual activity: Not Currently     Social Determinants of Health     Financial Resource Strain: Low Risk  (4/19/2023)    Overall Financial Resource Strain (CARDIA)     Difficulty of Paying Living Expenses: Not hard at all   Food Insecurity: No Food Insecurity (4/19/2023)    Hunger Vital Sign     Worried About Running Out of Food in the Last Year: Never true     Ran Out of Food in the Last Year: Never true   Transportation Needs: No Transportation Needs (4/19/2023)    PRAPARE - Transportation     Lack of Transportation (Medical): No     Lack of Transportation (Non-Medical): No   Physical Activity: Insufficiently Active (4/19/2023)    Exercise Vital Sign     Days of Exercise per Week: 2 days     Minutes of Exercise per Session: 30 min   Stress: No Stress Concern Present (4/19/2023)    [place] [employer]ational [employer] - Occupational Stress Questionnaire     Feeling of Stress : Only a little   Social Connections: Unknown (4/19/2023)    Social Connection and Isolation Panel [NHANES]     Frequency of Communication with Friends and Family: Three times a week     Frequency of Social Gatherings with Friends and Family: Once a week     Attends Druze Services: Patient refused     Active Member of Clubs or Organizations: Patient refused     Attends Club or Organization Meetings: Patient refused     Marital Status:    Housing Stability: Low Risk  (4/19/2023)    Housing Stability Vital Sign     Unable to Pay for Housing in the Last Year: No     Number of Places Lived in the Last Year: 1     Unstable Housing in the Last Year: No       Review of patient's allergies indicates:  No Known Allergies    Outpatient Medications Marked as Taking for the 8/30/23 encounter (Office Visit) with Renuka Adams MD   Medication Sig Dispense Refill    ascorbic acid, vitamin C, (VITAMIN C) 500 MG tablet Take 500 mg by mouth.      aspirin (ECOTRIN)  "81 MG EC tablet Take 81 mg by mouth once daily.      cycloSPORINE (RESTASIS) 0.05 % ophthalmic emulsion Apply 1 drop to eye 2 (two) times daily.      dorzolamide (TRUSOPT) 2 % ophthalmic solution Apply 1 drop to eye 3 (three) times daily.      dorzolamide-timolol, PF, (COSOPT PF) 2-0.5 % Dpet ophthalmic solution Place 1 drop into both eyes 2 (two) times daily.      furosemide (LASIX) 40 MG tablet Take 40 mg by mouth 2 (two) times a day.      hydrALAZINE (APRESOLINE) 50 MG tablet Take 50 mg by mouth 2 (two) times daily.      isosorbide mononitrate (IMDUR) 30 MG 24 hr tablet Take 1 tablet by mouth every morning.      losartan (COZAAR) 100 MG tablet TAKE 1 TABLET BY MOUTH ONCE DAILY AT  6AM 90 tablet 1    oxybutynin (DITROPAN) 5 MG Tab Take 2.5 mg by mouth 2 (two) times daily.      potassium chloride SA (K-DUR,KLOR-CON) 20 MEQ tablet Take 20 mEq by mouth 2 (two) times daily.      rosuvastatin (CRESTOR) 20 MG tablet Take 20 mg by mouth once daily.      tamsulosin (FLOMAX) 0.4 mg Cap Take 2 capsules by mouth every evening.      timolol maleate 0.5% (TIMOPTIC) 0.5 % Drop Apply 1 drop to eye once daily at 6am.         Patient Care Team:  Renuka Adams MD as PCP - General (Internal Medicine)  Tristan Lozoya MD as Consulting Physician (Cardiology)  Giacomo Scott MD as Consulting Physician (Urology)  Treva Rubio (Inactive)  Marclel Gauthier MD as Consulting Physician (Colon and Rectal Surgery)  Juan Carlos Nuno MD as Consulting Physician (Otolaryngology)  Jude Cho MD as Consulting Physician (Gastroenterology)  Chris Nichols MD as Consulting Physician (Orthopedic Surgery)  Renuka Adams MD as Consulting Physician (Internal Medicine)       Objective:     BP (!) 94/49   Pulse 61   Temp 97.3 °F (36.3 °C) (Oral)   Resp 15   Ht 5' 7" (1.702 m)   Wt 101.2 kg (223 lb)   SpO2 (!) 94%   BMI 34.93 kg/m²     Physical Exam  Vitals reviewed.   Constitutional:       Appearance: He is obese. He is not " ill-appearing.   HENT:      Head: Normocephalic and atraumatic.      Ears:      Comments: Bilateral hearing aides, he is hearing my questions much easier  Cardiovascular:      Rate and Rhythm: Normal rate.      Heart sounds:      Gallop present.   Pulmonary:      Breath sounds: No wheezing or rhonchi.      Comments: Decreased in base  Abdominal:      Palpations: Abdomen is soft.      Tenderness: There is no abdominal tenderness. There is no guarding.   Skin:     General: Skin is warm and dry.      Findings: No bruising.   Neurological:      Gait: Gait abnormal.   Psychiatric:         Mood and Affect: Mood normal.         Behavior: Behavior normal.         Thought Content: Thought content normal.         Judgment: Judgment normal.               Assessment/Plan:     1. Benign hypertension  Comments:  Trending low with medication, will try decrease Hydralazine to BID and send note to Devora.     2. Atherosclerosis of coronary artery bypass graft of native heart without angina pectoris  Comments:  No angina, Devora pushed him out to Nov. SOB is just with bending over.     3. Orthostatic hypotension  Comments:  Watch BP on reduced Hydralazine. Note to [facility]             Follow up in about 3 months (around 11/30/2023), or BP med change. In addition to their scheduled follow up, the patient has also been instructed to follow up on as needed basis.     Signature:  Renuka Adams MD  [facility]  [id number] [id number]

## 2023-08-30 ENCOUNTER — OFFICE VISIT (OUTPATIENT)
Dept: PRIMARY CARE CLINIC | Facility: CLINIC | Age: 83
End: 2023-08-30
Payer: MEDICARE

## 2023-08-30 VITALS
HEART RATE: 61 BPM | OXYGEN SATURATION: 94 % | DIASTOLIC BLOOD PRESSURE: 49 MMHG | BODY MASS INDEX: 35 KG/M2 | RESPIRATION RATE: 15 BRPM | SYSTOLIC BLOOD PRESSURE: 94 MMHG | HEIGHT: 67 IN | WEIGHT: 223 LBS | TEMPERATURE: 97 F

## 2023-08-30 DIAGNOSIS — I10 BENIGN HYPERTENSION: Primary | ICD-10-CM

## 2023-08-30 DIAGNOSIS — I95.1 ORTHOSTATIC HYPOTENSION: ICD-10-CM

## 2023-08-30 DIAGNOSIS — I25.810 ATHEROSCLEROSIS OF CORONARY ARTERY BYPASS GRAFT OF NATIVE HEART WITHOUT ANGINA PECTORIS: ICD-10-CM

## 2023-08-30 PROCEDURE — 99214 PR OFFICE/OUTPT VISIT, EST, LEVL IV, 30-39 MIN: ICD-10-PCS | Mod: ,,, | Performed by: INTERNAL MEDICINE

## 2023-08-30 PROCEDURE — 99214 OFFICE O/P EST MOD 30 MIN: CPT | Mod: ,,, | Performed by: INTERNAL MEDICINE

## 2023-08-30 RX ORDER — CLOTRIMAZOLE AND BETAMETHASONE DIPROPIONATE 10; .64 MG/G; MG/G
1 CREAM TOPICAL 2 TIMES DAILY
COMMUNITY
Start: 2023-08-18

## 2023-08-30 RX ORDER — OXYBUTYNIN CHLORIDE 5 MG/1
2.5 TABLET ORAL 2 TIMES DAILY
COMMUNITY
Start: 2023-06-19 | End: 2023-12-05 | Stop reason: ALTCHOICE

## 2023-09-29 PROBLEM — H40.1190 PRIMARY OPEN ANGLE GLAUCOMA (POAG): Status: ACTIVE | Noted: 2023-09-29

## 2023-10-02 ENCOUNTER — LAB VISIT (OUTPATIENT)
Dept: LAB | Facility: HOSPITAL | Age: 83
End: 2023-10-02
Attending: INTERNAL MEDICINE
Payer: MEDICARE

## 2023-10-02 DIAGNOSIS — I71.20 ANEURYSM, AORTA, THORACIC: Primary | ICD-10-CM

## 2023-10-02 DIAGNOSIS — R68.84 JAW PAIN: ICD-10-CM

## 2023-10-02 LAB
CRP SERPL HS-MCNC: 1.76 MG/L
ERYTHROCYTE [SEDIMENTATION RATE] IN BLOOD: 16 MM/HR (ref 0–15)

## 2023-10-02 PROCEDURE — 85652 RBC SED RATE AUTOMATED: CPT

## 2023-10-02 PROCEDURE — 86141 C-REACTIVE PROTEIN HS: CPT

## 2023-10-02 PROCEDURE — 36415 COLL VENOUS BLD VENIPUNCTURE: CPT

## 2023-10-16 ENCOUNTER — HOSPITAL ENCOUNTER (OUTPATIENT)
Dept: RADIOLOGY | Facility: HOSPITAL | Age: 83
Discharge: HOME | End: 2023-10-16
Attending: INTERNAL MEDICINE
Payer: MEDICARE

## 2023-10-16 DIAGNOSIS — I71.20 ANEURYSM, AORTA, THORACIC: ICD-10-CM

## 2023-10-16 LAB
CREAT SERPL-MCNC: 1.3 MG/DL (ref 0.5–1.4)
SAMPLE: NORMAL

## 2023-10-16 PROCEDURE — 71275 CT ANGIOGRAPHY CHEST: CPT | Mod: TC

## 2023-10-16 PROCEDURE — 25500020 PHARM REV CODE 255: Performed by: INTERNAL MEDICINE

## 2023-10-16 RX ADMIN — IOPAMIDOL 100 ML: 755 INJECTION, SOLUTION INTRAVENOUS at 09:10

## 2023-10-20 DIAGNOSIS — I71.20 ANEURYSM, AORTA, THORACIC: Primary | ICD-10-CM

## 2023-11-17 PROBLEM — I51.89 DIASTOLIC DYSFUNCTION: Status: ACTIVE | Noted: 2023-11-17

## 2023-11-28 ENCOUNTER — TELEPHONE (OUTPATIENT)
Dept: PRIMARY CARE CLINIC | Facility: CLINIC | Age: 83
End: 2023-11-28
Payer: MEDICARE

## 2023-11-28 NOTE — TELEPHONE ENCOUNTER
PT CONFIRMED APPT     1. Are there any outstanding tasks in the patient's chart? (Ex. Labs, MMG)?-    2. Do we have any outstanding/Pending referrals?-    3. Has the patient been seen in an ER, Urgent Care or been admitted to the hospital since last office visit with our office?-    4. Has the patient seen any other health care provider (doctors) since last visit?-    5. Has the patient had any blood work or x-rays done since last visit?-    QUALITY-DO NOT ASK PATIENT    -PNEUMONIA  13:10/15/18  20:-  23:10/19/07    -COLON:4/26/17    -DEXA:-    -DIABETES SCREEN  A1C:2/27/23  MICRO UA:-  EYE EXAM:-  FOOT EXAM:-

## 2023-12-01 NOTE — PROGRESS NOTES
Renuka Adams MD   [id number] [address]     Patient ID: [id number]     Chief Complaint: Hypertension        HPI:     Marquis Quintero is a 83 y.o. male here today for a follow up of HTN. He has stopped all his medications since he was having blurred vision. It is better since he stopped the medications. He had talked to his eye doctor and she had tried a new drop but it didn't help. She put him back on the old drops for cost. He did not talk to his heart doctor about stopping the medications.       Subjective:     Review of Systems   Cardiovascular:  Positive for palpitations. Negative for chest pain.        [facility] and he said it hasn't gotten worse so they are watching. He stopped isosorbide, statin, both diuretics and BP meds.    Gastrointestinal: Negative.    Genitourinary:  Positive for frequency.        He stopped his prostate medications   Skin:  Positive for itching and rash.        Rash broke out near left mouth, he has cream at home but hasn't started, also irritation around the right eye laterally like it's got a hair in it. It itches at times.    Psychiatric/Behavioral:  The patient is nervous/anxious.        Past Medical History:   Diagnosis Date    Aortic regurgitation     Carotid artery stenosis     HTN (hypertension)     Internal hemorrhage     Mitral regurgitation     NSTEMI (non-ST elevated myocardial infarction) 02/07/2023    [facility]    Personal history of colonic polyps     Skin cancer     Unspecified glaucoma         Past Surgical History:   Procedure Laterality Date    COLONOSCOPY      CYSTOSCOPY      EXTRACTION OF CATARACT      PROSTATE BIOPSY         Family History   Problem Relation Age of Onset    Colon cancer Mother     Heart disease Mother     Alzheimer's disease Father         Social History     Socioeconomic History    Marital status:    Tobacco Use    Smoking status: Never    Smokeless tobacco: Never   Substance and Sexual Activity     Alcohol use: Never    Drug use: Never    Sexual activity: Not Currently     Social Determinants of Health     Financial Resource Strain: Low Risk  (4/19/2023)    Overall Financial Resource Strain (CARDIA)     Difficulty of Paying Living Expenses: Not hard at all   Food Insecurity: No Food Insecurity (4/19/2023)    Hunger Vital Sign     Worried About Running Out of Food in the Last Year: Never true     Ran Out of Food in the Last Year: Never true   Transportation Needs: No Transportation Needs (4/19/2023)    PRAPARE - Transportation     Lack of Transportation (Medical): No     Lack of Transportation (Non-Medical): No   Physical Activity: Insufficiently Active (4/19/2023)    Exercise Vital Sign     Days of Exercise per Week: 2 days     Minutes of Exercise per Session: 30 min   Stress: No Stress Concern Present (4/19/2023)    [employer] of Occupational Health - Occupational Stress Questionnaire     Feeling of Stress : Only a little   Social Connections: Unknown (4/19/2023)    Social Connection and Isolation Panel [NHANES]     Frequency of Communication with Friends and Family: Three times a week     Frequency of Social Gatherings with Friends and Family: Once a week     Attends Moravian Services: Patient refused     Active Member of Clubs or Organizations: Patient refused     Attends Club or Organization Meetings: Patient refused     Marital Status:    Housing Stability: Low Risk  (4/19/2023)    Housing Stability Vital Sign     Unable to Pay for Housing in the Last Year: No     Number of Places Lived in the Last Year: 1     Unstable Housing in the Last Year: No       Review of patient's allergies indicates:  No Known Allergies    Outpatient Medications Marked as Taking for the 12/5/23 encounter (Office Visit) with Renuka Adams MD   Medication Sig Dispense Refill    furosemide (LASIX) 40 MG tablet Take 40 mg by mouth 2 (two) times a day.      hydrALAZINE (APRESOLINE) 50 MG tablet Take 50 mg by mouth 2  "(two) times daily.      isosorbide mononitrate (IMDUR) 30 MG 24 hr tablet Take 1 tablet by mouth every morning.      losartan (COZAAR) 100 MG tablet TAKE 1 TABLET BY MOUTH ONCE DAILY AT  6  AM 90 tablet 1    metOLazone (ZAROXOLYN) 2.5 MG tablet TAKE 1 TABLET BY MOUTH ONCE DAILY IN THE MORNING 30 MINUTES PRIOR TO LASIX (FUROSEMIDE)      potassium chloride SA (K-DUR,KLOR-CON) 20 MEQ tablet Take 20 mEq by mouth 2 (two) times daily.      rosuvastatin (CRESTOR) 20 MG tablet Take 20 mg by mouth once daily.      tamsulosin (FLOMAX) 0.4 mg Cap Take 2 capsules by mouth every evening.      [DISCONTINUED] oxybutynin (DITROPAN) 5 MG Tab Take 2.5 mg by mouth 2 (two) times daily.         Patient Care Team:  Renuka Adams MD as PCP - General (Internal Medicine)  Tristan Lozoya MD as Consulting Physician (Cardiology)  Giacomo Scott MD as Consulting Physician (Urology)  Treva Rubio (Inactive)  Marcell Gauthier MD as Consulting Physician (Colon and Rectal Surgery)  Juan Carlos Nuno MD as Consulting Physician (Otolaryngology)  Jude Cho MD as Consulting Physician (Gastroenterology)  Chris Nichols MD as Consulting Physician (Orthopedic Surgery)  Renuka Adams MD as Consulting Physician (Internal Medicine)       Objective:     BP (!) 146/82 (BP Location: Left arm, Patient Position: Sitting, BP Method: Large (Manual))   Pulse (!) 50   Temp 98.1 °F (36.7 °C) (Oral)   Resp 16   Ht 5' 7" (1.702 m)   Wt 99.8 kg (220 lb)   SpO2 98%   BMI 34.46 kg/m²     Physical Exam  Vitals reviewed.   Constitutional:       Appearance: He is obese. He is ill-appearing.   Cardiovascular:      Rate and Rhythm: Normal rate and regular rhythm.   Pulmonary:      Effort: Pulmonary effort is normal.      Breath sounds: Normal breath sounds. No rales.   Abdominal:      Palpations: Abdomen is soft.      Tenderness: There is no abdominal tenderness. There is no guarding.   Skin:     Comments: Cluster of red lesions on the left " face near the mouth but not on lip, there is a red spot on the mid cheek now as well.    Neurological:      Mental Status: He is alert.               Assessment/Plan:     1. Orthostatic hypotension  Comments:  He's not high enough to want to restart his hydralazine, will watch    2. Uncontrolled hypertension  Comments:  Needs to restart losartan and diurectics, ok to hold hydralazine for now.    3. Medically noncompliant  Comments:  Restart isosorbide, furosemide, metalozone, Potassium and Losartan. Ok to hold prostate/bldder and hydralazine for now    4. Herpetiform eruption  Comments:  near mouth but may be another cluster starting on cheek, start Valtrex, if progresses to Eye. Doubt staph but will use Mupirocin topically    5. Coronary artery disease of native artery of native heart with stable angina pectoris  Comments:  Needs to restart his statin and isosorbide, they are not optional, if want to change them talk with Devora.    Other orders  -     mupirocin (BACTROBAN) 2 % ointment; Apply topically 3 (three) times daily.  Dispense: 30 g; Refill: 1  -     valACYclovir (VALTREX) 500 MG tablet; Take 1 tablet (500 mg total) by mouth 2 (two) times daily.  Dispense: 20 tablet; Refill: 0             Follow up in about 4 weeks (around 1/2/2024) for Medication Managment. In addition to their scheduled follow up, the patient has also been instructed to follow up on as needed basis.     Signature:  Renuka Adams MD  [facility]  [id number] [place] [address]

## 2023-12-05 ENCOUNTER — OFFICE VISIT (OUTPATIENT)
Dept: PRIMARY CARE CLINIC | Facility: CLINIC | Age: 83
End: 2023-12-05
Payer: MEDICARE

## 2023-12-05 VITALS
TEMPERATURE: 98 F | HEART RATE: 50 BPM | HEIGHT: 67 IN | WEIGHT: 220 LBS | RESPIRATION RATE: 16 BRPM | SYSTOLIC BLOOD PRESSURE: 146 MMHG | DIASTOLIC BLOOD PRESSURE: 82 MMHG | BODY MASS INDEX: 34.53 KG/M2 | OXYGEN SATURATION: 98 %

## 2023-12-05 DIAGNOSIS — L13.0: ICD-10-CM

## 2023-12-05 DIAGNOSIS — I95.1 ORTHOSTATIC HYPOTENSION: Primary | ICD-10-CM

## 2023-12-05 DIAGNOSIS — I10 UNCONTROLLED HYPERTENSION: ICD-10-CM

## 2023-12-05 DIAGNOSIS — I25.118 CORONARY ARTERY DISEASE OF NATIVE ARTERY OF NATIVE HEART WITH STABLE ANGINA PECTORIS: ICD-10-CM

## 2023-12-05 DIAGNOSIS — Z91.199 MEDICALLY NONCOMPLIANT: ICD-10-CM

## 2023-12-05 PROCEDURE — 99214 PR OFFICE/OUTPT VISIT, EST, LEVL IV, 30-39 MIN: ICD-10-PCS | Mod: ,,, | Performed by: INTERNAL MEDICINE

## 2023-12-05 PROCEDURE — 99214 OFFICE O/P EST MOD 30 MIN: CPT | Mod: ,,, | Performed by: INTERNAL MEDICINE

## 2023-12-05 RX ORDER — METOLAZONE 2.5 MG/1
TABLET ORAL
COMMUNITY
Start: 2023-10-11

## 2023-12-05 RX ORDER — VALACYCLOVIR HYDROCHLORIDE 500 MG/1
500 TABLET, FILM COATED ORAL 2 TIMES DAILY
Qty: 20 TABLET | Refills: 0 | Status: SHIPPED | OUTPATIENT
Start: 2023-12-05

## 2023-12-05 RX ORDER — MUPIROCIN 20 MG/G
OINTMENT TOPICAL 3 TIMES DAILY
Qty: 30 G | Refills: 1 | Status: SHIPPED | OUTPATIENT
Start: 2023-12-05

## 2024-01-08 PROBLEM — I25.118 CORONARY ARTERY DISEASE OF NATIVE ARTERY OF NATIVE HEART WITH STABLE ANGINA PECTORIS: Status: ACTIVE | Noted: 2022-05-09

## 2024-01-08 PROBLEM — I11.0 HYPERTENSIVE HEART DISEASE WITH HEART FAILURE: Status: ACTIVE | Noted: 2024-01-08

## 2024-01-09 ENCOUNTER — OFFICE VISIT (OUTPATIENT)
Dept: PRIMARY CARE CLINIC | Facility: CLINIC | Age: 84
End: 2024-01-09
Payer: MEDICARE

## 2024-01-09 VITALS
TEMPERATURE: 98 F | HEART RATE: 69 BPM | WEIGHT: 225 LBS | OXYGEN SATURATION: 94 % | RESPIRATION RATE: 16 BRPM | HEIGHT: 67 IN | BODY MASS INDEX: 35.31 KG/M2 | SYSTOLIC BLOOD PRESSURE: 124 MMHG | DIASTOLIC BLOOD PRESSURE: 66 MMHG

## 2024-01-09 DIAGNOSIS — M25.552 BILATERAL HIP PAIN: ICD-10-CM

## 2024-01-09 DIAGNOSIS — I10 UNCONTROLLED HYPERTENSION: Primary | ICD-10-CM

## 2024-01-09 DIAGNOSIS — I11.0 HYPERTENSIVE HEART DISEASE WITH HEART FAILURE: ICD-10-CM

## 2024-01-09 DIAGNOSIS — M25.551 BILATERAL HIP PAIN: ICD-10-CM

## 2024-01-09 DIAGNOSIS — H53.8 BLURRED VISION: ICD-10-CM

## 2024-01-09 DIAGNOSIS — Z85.46 HISTORY OF PROSTATE CANCER: ICD-10-CM

## 2024-01-09 DIAGNOSIS — I25.118 CORONARY ARTERY DISEASE OF NATIVE ARTERY OF NATIVE HEART WITH STABLE ANGINA PECTORIS: ICD-10-CM

## 2024-01-09 PROCEDURE — 99214 OFFICE O/P EST MOD 30 MIN: CPT | Mod: ,,, | Performed by: INTERNAL MEDICINE

## 2024-01-09 NOTE — PROGRESS NOTES
Renuka Adams MD   [address] [address]     Patient ID: [id number]     Chief Complaint: Follow up for medication management         HPI:     Marquis Quintero is a 83 y.o. male here today for a follow up of BP. He had Dr Lozoya's office call to restart medication. He will go next month to his eye doctor, the eyes are blurry at times.       Subjective:     Review of Systems   Respiratory: Negative.     Cardiovascular: Negative.    Musculoskeletal:  Positive for back pain and joint pain.        He notes the pain in his back moved from the right side now it's the left leg that bothers him. He stopped exercising and it feels better.        Past Medical History:   Diagnosis Date    Aortic regurgitation     Carotid artery stenosis     HTN (hypertension)     Internal hemorrhage     Mitral regurgitation     NSTEMI (non-ST elevated myocardial infarction) 02/07/2023    Admission [facility]    Personal history of colonic polyps     Skin cancer     Unspecified glaucoma         Past Surgical History:   Procedure Laterality Date    COLONOSCOPY      CYSTOSCOPY      EXTRACTION OF CATARACT      PROSTATE BIOPSY         Family History   Problem Relation Age of Onset    Colon cancer Mother     Heart disease Mother     Alzheimer's disease Father         Social History     Socioeconomic History    Marital status:    Tobacco Use    Smoking status: Never    Smokeless tobacco: Never   Substance and Sexual Activity    Alcohol use: Never    Drug use: Never    Sexual activity: Not Currently     Social Determinants of Health     Financial Resource Strain: Low Risk  (4/19/2023)    Overall Financial Resource Strain (CARDIA)     Difficulty of Paying Living Expenses: Not hard at all   Food Insecurity: No Food Insecurity (4/19/2023)    Hunger Vital Sign     Worried About Running Out of Food in the Last Year: Never true     Ran Out of Food in the Last Year: Never true   Transportation Needs: No Transportation Needs (4/19/2023)     PRAPARE - Transportation     Lack of Transportation (Medical): No     Lack of Transportation (Non-Medical): No   Physical Activity: Insufficiently Active (4/19/2023)    Exercise Vital Sign     Days of Exercise per Week: 2 days     Minutes of Exercise per Session: 30 min   Stress: No Stress Concern Present (4/19/2023)    [employer] [place] of Occupational Health - Occupational Stress Questionnaire     Feeling of Stress : Only a little   Social Connections: Unknown (4/19/2023)    Social Connection and Isolation Panel [NHANES]     Frequency of Communication with Friends and Family: Three times a week     Frequency of Social Gatherings with Friends and Family: Once a week     Attends Orthodoxy Services: Patient declined     Active Member of Clubs or Organizations: Patient declined     Attends Club or Organization Meetings: Patient declined     Marital Status:    Housing Stability: Low Risk  (4/19/2023)    Housing Stability Vital Sign     Unable to Pay for Housing in the Last Year: No     Number of Places Lived in the Last Year: 1     Unstable Housing in the Last Year: No       Review of patient's allergies indicates:  No Known Allergies    Outpatient Medications Marked as Taking for the 1/9/24 encounter (Office Visit) with Renuka Adams MD   Medication Sig Dispense Refill    aspirin (ECOTRIN) 81 MG EC tablet Take 81 mg by mouth once daily.      clotrimazole-betamethasone 1-0.05% (LOTRISONE) cream Apply 1 g topically 2 (two) times daily.      dorzolamide (TRUSOPT) 2 % ophthalmic solution Apply 1 drop to eye 3 (three) times daily.      furosemide (LASIX) 40 MG tablet Take 40 mg by mouth 2 (two) times a day.      hydrALAZINE (APRESOLINE) 50 MG tablet Take 50 mg by mouth 2 (two) times daily.      isosorbide mononitrate (IMDUR) 30 MG 24 hr tablet Take 1 tablet by mouth every morning.      losartan (COZAAR) 100 MG tablet TAKE 1 TABLET BY MOUTH ONCE DAILY AT  6  AM 90 tablet 1    metOLazone (ZAROXOLYN) 2.5 MG tablet  "TAKE 1 TABLET BY MOUTH ONCE DAILY IN THE MORNING 30 MINUTES PRIOR TO LASIX (FUROSEMIDE)      mupirocin (BACTROBAN) 2 % ointment Apply topically 3 (three) times daily. 30 g 1    potassium chloride SA (K-DUR,KLOR-CON) 20 MEQ tablet Take 20 mEq by mouth 2 (two) times daily.      rosuvastatin (CRESTOR) 20 MG tablet Take 20 mg by mouth once daily.      tafluprost, PF, (ZIOPTAN, PF,) 0.0015 % Dpet Apply 1 drop to eye once daily at 6am.      timolol maleate 0.5% (TIMOPTIC) 0.5 % Drop Apply 1 drop to eye once daily at 6am.         Patient Care Team:  Renuka Adams MD as PCP - General (Internal Medicine)  Tristan Lozoya MD as Consulting Physician (Cardiology)  Giacomo Scott MD as Consulting Physician (Urology)  Treva Rubio (Inactive)  Marcell Gauthier MD as Consulting Physician (Colon and Rectal Surgery)  Juan Carlos Nuno MD as Consulting Physician (Otolaryngology)  Jude Cho MD as Consulting Physician (Gastroenterology)  Chris Nichols MD as Consulting Physician (Orthopedic Surgery)  Renuka Adams MD as Consulting Physician (Internal Medicine)       Objective:     BP (!) 98/53   Pulse 69   Temp 98.2 °F (36.8 °C) (Oral)   Resp 16   Ht 5' 7" (1.702 m)   Wt 102.1 kg (225 lb)   SpO2 (!) 94%   BMI 35.24 kg/m²     Physical Exam  Vitals reviewed.   Cardiovascular:      Rate and Rhythm: Normal rate and regular rhythm.   Pulmonary:      Effort: Pulmonary effort is normal.      Breath sounds: Normal breath sounds.   Skin:     General: Skin is warm and dry.   Neurological:      Mental Status: He is alert and oriented to person, place, and time. Mental status is at baseline.               Assessment/Plan:     1. Uncontrolled hypertension  Comments:  Now he's back low since Devora restarted the medication.    2. Hypertensive heart disease with heart failure    3. History of prostate cancer    4. Coronary artery disease of native artery of native heart with stable angina pectoris  Comments:  He's " tolerating the lower blood pressure now with his Imdur    5. Bilateral hip pain  Comments:  Will change exercise to walking and see if it is better, he may need injecitons if it doesn't improve.    6. Blurred vision  Comments:  Would watch, I would expect dizziness if it was coming from low BP, he'll see eye next week             Follow up in about 3 months (around 4/9/2024). In addition to their scheduled follow up, the patient has also been instructed to follow up on as needed basis.     Signature:  Renuka Adams MD  [facility]  [id number] [place] [address]

## 2024-02-21 ENCOUNTER — LAB VISIT (OUTPATIENT)
Dept: LAB | Facility: HOSPITAL | Age: 84
End: 2024-02-21
Attending: INTERNAL MEDICINE
Payer: MEDICARE

## 2024-02-21 DIAGNOSIS — I10 ESSENTIAL HYPERTENSION, MALIGNANT: ICD-10-CM

## 2024-02-21 DIAGNOSIS — I25.10 CORONARY ATHEROSCLEROSIS OF NATIVE CORONARY ARTERY: Primary | ICD-10-CM

## 2024-02-21 PROBLEM — N28.9 RENAL INSUFFICIENCY: Status: ACTIVE | Noted: 2024-02-21

## 2024-02-21 LAB
ALBUMIN SERPL-MCNC: 3.8 G/DL (ref 3.4–4.8)
ALBUMIN/GLOB SERPL: 1.3 RATIO (ref 1.1–2)
ALP SERPL-CCNC: 60 UNIT/L (ref 40–150)
ALT SERPL-CCNC: 13 UNIT/L (ref 0–55)
AST SERPL-CCNC: 23 UNIT/L (ref 5–34)
BILIRUB SERPL-MCNC: 0.7 MG/DL
BUN SERPL-MCNC: 17.9 MG/DL (ref 8.4–25.7)
CALCIUM SERPL-MCNC: 9.6 MG/DL (ref 8.8–10)
CHLORIDE SERPL-SCNC: 100 MMOL/L (ref 98–107)
CHOLEST SERPL-MCNC: 162 MG/DL
CHOLEST/HDLC SERPL: 4 {RATIO} (ref 0–5)
CO2 SERPL-SCNC: 33 MMOL/L (ref 23–31)
CREAT SERPL-MCNC: 1.19 MG/DL (ref 0.73–1.18)
GFR SERPLBLD CREATININE-BSD FMLA CKD-EPI: >60 MLS/MIN/1.73/M2
GLOBULIN SER-MCNC: 2.9 GM/DL (ref 2.4–3.5)
GLUCOSE SERPL-MCNC: 107 MG/DL (ref 82–115)
HDLC SERPL-MCNC: 39 MG/DL (ref 35–60)
LDLC SERPL CALC-MCNC: 92 MG/DL (ref 50–140)
POTASSIUM SERPL-SCNC: 4 MMOL/L (ref 3.5–5.1)
PROT SERPL-MCNC: 6.7 GM/DL (ref 5.8–7.6)
SODIUM SERPL-SCNC: 144 MMOL/L (ref 136–145)
TRIGL SERPL-MCNC: 155 MG/DL (ref 34–140)
VLDLC SERPL CALC-MCNC: 31 MG/DL

## 2024-02-21 PROCEDURE — 80053 COMPREHEN METABOLIC PANEL: CPT

## 2024-02-21 PROCEDURE — 36415 COLL VENOUS BLD VENIPUNCTURE: CPT

## 2024-02-21 PROCEDURE — 80061 LIPID PANEL: CPT

## 2024-03-29 RX ORDER — LOSARTAN POTASSIUM 100 MG/1
100 TABLET ORAL
Qty: 90 TABLET | Refills: 1 | Status: SHIPPED | OUTPATIENT
Start: 2024-03-29

## 2024-04-08 NOTE — PROGRESS NOTES
Renuka Adams MD   [id number] [id number]     Patient ID: [id number]     Chief Complaint: 3 Months follow up for HTN (Complain of left breast pain and belching a lot.)        HPI:     Marquis Quintero is a 83 y.o. male here today for a follow up of HTN, CAD, CKD, DJD and Aneurysm in aorta. He's having pain on his left nipple. It started a few months ago. He notes he's got a lot of arthritis. The hip is hurting. He has been less active due to his heart and it's been hard on him. He likes to stay active.        Subjective:     Review of Systems   HENT:          Has dentures   Gastrointestinal:         Belching loudly without even trying,    Genitourinary:         Urinating every 2-3 hours last night.   Musculoskeletal:  Positive for joint pain.        He has been putting his left arm under head and then his shoulder hurts. Both hips will hurt if he lays on them. They feel better when he's moving, it's hard at first then good when it warms up. Standing long will get him hurting in both hips       Past Medical History:   Diagnosis Date    Aortic regurgitation     Carotid artery stenosis     HTN (hypertension)     Internal hemorrhage     Mitral regurgitation     NSTEMI (non-ST elevated myocardial infarction) 02/07/2023    Admission [facility]    Personal history of colonic polyps     Skin cancer     Unspecified glaucoma         Past Surgical History:   Procedure Laterality Date    COLONOSCOPY      CYSTOSCOPY      EXTRACTION OF CATARACT      PROSTATE BIOPSY         Family History   Problem Relation Age of Onset    Colon cancer Mother     Heart disease Mother     Alzheimer's disease Father         Social History     Socioeconomic History    Marital status:    Tobacco Use    Smoking status: Never    Smokeless tobacco: Never   Substance and Sexual Activity    Alcohol use: Never    Drug use: Never    Sexual activity: Not Currently     Social Determinants of Health     Financial Resource Strain: Low Risk   (4/19/2023)    Overall Financial Resource Strain (CARDIA)     Difficulty of Paying Living Expenses: Not hard at all   Food Insecurity: No Food Insecurity (4/19/2023)    Hunger Vital Sign     Worried About Running Out of Food in the Last Year: Never true     Ran Out of Food in the Last Year: Never true   Transportation Needs: No Transportation Needs (4/19/2023)    PRAPARE - Transportation     Lack of Transportation (Medical): No     Lack of Transportation (Non-Medical): No   Physical Activity: Insufficiently Active (4/19/2023)    Exercise Vital Sign     Days of Exercise per Week: 2 days     Minutes of Exercise per Session: 30 min   Stress: No Stress Concern Present (4/19/2023)    Citizen of [place] [employer] - Occupational Stress Questionnaire     Feeling of Stress : Only a little   Social Connections: Unknown (4/19/2023)    Social Connection and Isolation Panel [NHANES]     Frequency of Communication with Friends and Family: Three times a week     Frequency of Social Gatherings with Friends and Family: Once a week     Attends Mu-ism Services: Patient declined     Active Member of Clubs or Organizations: Patient declined     Attends Club or Organization Meetings: Patient declined     Marital Status:    Housing Stability: Low Risk  (4/19/2023)    Housing Stability Vital Sign     Unable to Pay for Housing in the Last Year: No     Number of Places Lived in the Last Year: 1     Unstable Housing in the Last Year: No       Review of patient's allergies indicates:  No Known Allergies    Outpatient Medications Marked as Taking for the 4/9/24 encounter (Office Visit) with Renuka Adams MD   Medication Sig Dispense Refill    dorzolamide (TRUSOPT) 2 % ophthalmic solution Apply 1 drop to eye 3 (three) times daily.      furosemide (LASIX) 40 MG tablet Take 40 mg by mouth 2 (two) times a day.      isosorbide mononitrate (IMDUR) 30 MG 24 hr tablet Take 1 tablet by mouth every morning.      losartan (COZAAR)  "100 MG tablet TAKE 1 TABLET BY MOUTH ONCE DAILY AT  6AM 90 tablet 1    metOLazone (ZAROXOLYN) 2.5 MG tablet TAKE 1 TABLET BY MOUTH ONCE DAILY IN THE MORNING 30 MINUTES PRIOR TO LASIX (FUROSEMIDE)      oxybutynin (DITROPAN) 5 MG Tab Take 5 mg by mouth 2 (two) times daily. TAKING 1/2 BID      potassium chloride SA (K-DUR,KLOR-CON) 20 MEQ tablet Take 20 mEq by mouth 2 (two) times daily.      rosuvastatin (CRESTOR) 40 MG Tab Take 40 mg by mouth once daily.      tafluprost, PF, (ZIOPTAN, PF,) 0.0015 % Dpet Apply 1 drop to eye once daily at 6am.      tamsulosin (FLOMAX) 0.4 mg Cap Take 2 capsules by mouth every evening.      timolol maleate 0.5% (TIMOPTIC) 0.5 % Drop Apply 1 drop to eye once daily at 6am.         Patient Care Team:  Renuka Adams MD as PCP - General (Internal Medicine)  Tristan Lozoya MD as Consulting Physician (Cardiology)  Giacomo Scott MD as Consulting Physician (Urology)  Treva Rubio (Inactive)  Marcell Gauthier MD as Consulting Physician (Colon and Rectal Surgery)  Juan Carlos Nuno MD as Consulting Physician (Otolaryngology)  Jude Cho MD as Consulting Physician (Gastroenterology)  Chris Nichols MD as Consulting Physician (Orthopedic Surgery)  Renuka Adams MD as Consulting Physician (Internal Medicine)       Objective:     /62   Pulse 65   Temp 97.7 °F (36.5 °C) (Oral)   Resp 16   Ht 5' 7" (1.702 m)   Wt 103.8 kg (228 lb 12.8 oz)   SpO2 (!) 93%   BMI 35.84 kg/m²     Physical Exam  Vitals reviewed.   HENT:      Head: Normocephalic and atraumatic.      Mouth/Throat:      Mouth: Mucous membranes are moist.      Pharynx: No oropharyngeal exudate or posterior oropharyngeal erythema.   Cardiovascular:      Rate and Rhythm: Normal rate and regular rhythm.      Heart sounds: Murmur heard.      No gallop.   Pulmonary:      Effort: Pulmonary effort is normal.      Breath sounds: Normal breath sounds.   Musculoskeletal:         General: Tenderness present.      " Right lower leg: Edema present.      Left lower leg: Edema present.      Comments: ROM on the right shoulder is full with crepitance the ROM on left is reduced but no crepitance.  Click in knees, tender to palpation in hips bilaterally   Skin:     General: Skin is warm.      Comments: No redness on nipple, he's really tender to light touch multiple areas in the surrounding tissue   Neurological:      Mental Status: He is alert.   Psychiatric:         Mood and Affect: Mood normal.         Behavior: Behavior normal.         Thought Content: Thought content normal.         Judgment: Judgment normal.               Assessment/Plan:     1. Essential hypertension  Comments:  Good today off hydralazine, he's not dizzy.    2. Hypotension due to drugs  Comments:  Improved, tolerating this BP well with the Losartan    3. Chronic kidney disease, stage 2 (mild)  Comments:  Creatinine yaniv with variable BP control will recheck if Devora isn't  Orders:  -     Basic Metabolic Panel    4. Nipple pain  Comments:  Will check US for nodules, he can't tolerate a MG at this time, probably irritated nerve after procedure  Orders:  -     US Soft Tissue Chest_Upper Back; Future; Expected date: 04/16/2024  -     C-Reactive Protein  -     Sedimentation Rate    5. Primary osteoarthritis involving multiple joints  Comments:  Avoid NSAID with his heart disease and renal issues. I'd stick with topicals, even steroids would have risks in his state  Orders:  -     C-Reactive Protein  -     Sedimentation Rate    6. Severe obesity (BMI 35.0-39.9) with comorbidity  Comments:  Weight up, he's not allowed to move as much wtih heart issues but is trying    7. Aneurysm of descending thoracic aorta without rupture  Comments:  No deep pain  Overview:  Stable on CTA 10/16/23 with Dr Lozoya      8. Aneurysm of ascending aorta without rupture  Comments:  Pain over breast/nipple is superficial  Overview:  On scan 4/4/23 ordered by Dr Lozoya, stable on CTA  10/16/23               Follow up in about 3 months (around 7/9/2024) for DJD. In addition to their scheduled follow up, the patient has also been instructed to follow up on as needed basis.     Signature:  Renuka Adams MD  [facility]  [id number] [id number]

## 2024-04-09 ENCOUNTER — TELEPHONE (OUTPATIENT)
Dept: PRIMARY CARE CLINIC | Facility: CLINIC | Age: 84
End: 2024-04-09

## 2024-04-09 ENCOUNTER — OFFICE VISIT (OUTPATIENT)
Dept: PRIMARY CARE CLINIC | Facility: CLINIC | Age: 84
End: 2024-04-09
Payer: MEDICARE

## 2024-04-09 VITALS
RESPIRATION RATE: 16 BRPM | WEIGHT: 228.81 LBS | SYSTOLIC BLOOD PRESSURE: 123 MMHG | OXYGEN SATURATION: 93 % | TEMPERATURE: 98 F | DIASTOLIC BLOOD PRESSURE: 62 MMHG | BODY MASS INDEX: 35.91 KG/M2 | HEART RATE: 65 BPM | HEIGHT: 67 IN

## 2024-04-09 DIAGNOSIS — N18.2 CHRONIC KIDNEY DISEASE, STAGE 2 (MILD): ICD-10-CM

## 2024-04-09 DIAGNOSIS — E66.01 SEVERE OBESITY (BMI 35.0-39.9) WITH COMORBIDITY: ICD-10-CM

## 2024-04-09 DIAGNOSIS — I71.23 ANEURYSM OF DESCENDING THORACIC AORTA WITHOUT RUPTURE: ICD-10-CM

## 2024-04-09 DIAGNOSIS — I71.21 ANEURYSM OF ASCENDING AORTA WITHOUT RUPTURE: ICD-10-CM

## 2024-04-09 DIAGNOSIS — N18.31 CHRONIC KIDNEY DISEASE, STAGE 3A: ICD-10-CM

## 2024-04-09 DIAGNOSIS — M13.0 POLYARTHRITIS: ICD-10-CM

## 2024-04-09 DIAGNOSIS — N64.4 NIPPLE PAIN: ICD-10-CM

## 2024-04-09 DIAGNOSIS — I95.2 HYPOTENSION DUE TO DRUGS: ICD-10-CM

## 2024-04-09 DIAGNOSIS — R70.0 ELEVATED SED RATE: Primary | ICD-10-CM

## 2024-04-09 DIAGNOSIS — M15.9 PRIMARY OSTEOARTHRITIS INVOLVING MULTIPLE JOINTS: ICD-10-CM

## 2024-04-09 DIAGNOSIS — I10 ESSENTIAL HYPERTENSION: Primary | ICD-10-CM

## 2024-04-09 LAB
ANION GAP SERPL CALC-SCNC: 12 MEQ/L
BUN SERPL-MCNC: 18.7 MG/DL (ref 8.4–25.7)
CALCIUM SERPL-MCNC: 9.4 MG/DL (ref 8.8–10)
CHLORIDE SERPL-SCNC: 100 MMOL/L (ref 98–107)
CO2 SERPL-SCNC: 30 MMOL/L (ref 23–31)
CREAT SERPL-MCNC: 1.3 MG/DL (ref 0.73–1.18)
CREAT/UREA NIT SERPL: 14
CRP SERPL-MCNC: 1.8 MG/L
ERYTHROCYTE [SEDIMENTATION RATE] IN BLOOD: 16 MM/HR (ref 0–15)
GFR SERPLBLD CREATININE-BSD FMLA CKD-EPI: 55 MLS/MIN/1.73/M2
GLUCOSE SERPL-MCNC: 121 MG/DL (ref 82–115)
POTASSIUM SERPL-SCNC: 3.4 MMOL/L (ref 3.5–5.1)
SODIUM SERPL-SCNC: 142 MMOL/L (ref 136–145)

## 2024-04-09 PROCEDURE — 85652 RBC SED RATE AUTOMATED: CPT | Performed by: INTERNAL MEDICINE

## 2024-04-09 PROCEDURE — 99214 OFFICE O/P EST MOD 30 MIN: CPT | Mod: ,,, | Performed by: INTERNAL MEDICINE

## 2024-04-09 PROCEDURE — 36415 COLL VENOUS BLD VENIPUNCTURE: CPT | Mod: ,,, | Performed by: INTERNAL MEDICINE

## 2024-04-09 PROCEDURE — 80048 BASIC METABOLIC PNL TOTAL CA: CPT | Performed by: INTERNAL MEDICINE

## 2024-04-09 PROCEDURE — 86140 C-REACTIVE PROTEIN: CPT | Performed by: INTERNAL MEDICINE

## 2024-04-09 PROCEDURE — 36415 COLL VENOUS BLD VENIPUNCTURE: CPT | Performed by: INTERNAL MEDICINE

## 2024-04-09 RX ORDER — POTASSIUM CHLORIDE 20 MEQ/1
TABLET, EXTENDED RELEASE ORAL
Qty: 216 TABLET | Refills: 1 | Status: SHIPPED | OUTPATIENT
Start: 2024-04-09

## 2024-04-09 RX ORDER — OXYBUTYNIN CHLORIDE 5 MG/1
5 TABLET ORAL 2 TIMES DAILY
COMMUNITY

## 2024-04-09 RX ORDER — ROSUVASTATIN CALCIUM 40 MG/1
40 TABLET, COATED ORAL DAILY
COMMUNITY
Start: 2024-03-22

## 2024-04-09 NOTE — TELEPHONE ENCOUNTER
----- Message from Renuka Adams MD sent at 4/9/2024  4:46 PM CDT -----  His potassium is low, is he taking his potassium twice daily as ordered? His kidney function is worsening. We definitely have to avoid arthritis medications like Ibuprofen, Advil, Aleve, Motrin just use Tylenol. One of the inflammation markers is elevated, the other is normal. I could set up rheumatology to see if there are any other options. Copy to Devora

## 2024-04-09 NOTE — TELEPHONE ENCOUNTER
Well let's do potassium three times a day on MWF and two times a day on the other days. I send the change to [facility]

## 2024-04-09 NOTE — TELEPHONE ENCOUNTER
Result given states he is taking potassium 1 bid but he is voiding a lot ok with referral to rheumatology.

## 2024-04-16 ENCOUNTER — HOSPITAL ENCOUNTER (OUTPATIENT)
Dept: RADIOLOGY | Facility: HOSPITAL | Age: 84
Discharge: HOME | End: 2024-04-16
Attending: INTERNAL MEDICINE
Payer: MEDICARE

## 2024-04-16 DIAGNOSIS — N64.4 NIPPLE PAIN: ICD-10-CM

## 2024-04-29 ENCOUNTER — HOSPITAL ENCOUNTER (OUTPATIENT)
Dept: RADIOLOGY | Facility: HOSPITAL | Age: 84
Discharge: HOME | End: 2024-04-29
Attending: INTERNAL MEDICINE
Payer: MEDICARE

## 2024-04-29 DIAGNOSIS — N63.22 MASS OF UPPER INNER QUADRANT OF LEFT BREAST: ICD-10-CM

## 2024-04-29 PROCEDURE — 77062 BREAST TOMOSYNTHESIS BI: CPT | Mod: 26,,, | Performed by: RADIOLOGY

## 2024-04-29 PROCEDURE — 77062 BREAST TOMOSYNTHESIS BI: CPT | Mod: TC

## 2024-04-29 PROCEDURE — 77066 DX MAMMO INCL CAD BI: CPT | Mod: 26,,, | Performed by: RADIOLOGY

## 2024-06-19 DIAGNOSIS — M25.561 RIGHT KNEE PAIN: Primary | ICD-10-CM

## 2024-07-03 ENCOUNTER — TELEPHONE (OUTPATIENT)
Dept: PRIMARY CARE CLINIC | Facility: CLINIC | Age: 84
End: 2024-07-03
Payer: MEDICARE

## 2024-07-11 ENCOUNTER — OFFICE VISIT (OUTPATIENT)
Dept: PRIMARY CARE CLINIC | Facility: CLINIC | Age: 84
End: 2024-07-11
Payer: MEDICARE

## 2024-07-11 VITALS
DIASTOLIC BLOOD PRESSURE: 58 MMHG | WEIGHT: 218 LBS | OXYGEN SATURATION: 94 % | HEIGHT: 67 IN | HEART RATE: 78 BPM | BODY MASS INDEX: 34.21 KG/M2 | SYSTOLIC BLOOD PRESSURE: 108 MMHG | TEMPERATURE: 98 F | RESPIRATION RATE: 15 BRPM

## 2024-07-11 DIAGNOSIS — I71.21 ANEURYSM OF ASCENDING AORTA WITHOUT RUPTURE: ICD-10-CM

## 2024-07-11 DIAGNOSIS — Z97.4 HEARING AID WORN: ICD-10-CM

## 2024-07-11 DIAGNOSIS — I71.23 ANEURYSM OF DESCENDING THORACIC AORTA WITHOUT RUPTURE: ICD-10-CM

## 2024-07-11 DIAGNOSIS — N18.31 CHRONIC KIDNEY DISEASE, STAGE 3A: ICD-10-CM

## 2024-07-11 DIAGNOSIS — I10 BENIGN HYPERTENSION: ICD-10-CM

## 2024-07-11 DIAGNOSIS — I11.0 HYPERTENSIVE HEART DISEASE WITH HEART FAILURE: ICD-10-CM

## 2024-07-11 DIAGNOSIS — H40.1120 PRIMARY OPEN ANGLE GLAUCOMA OF LEFT EYE, UNSPECIFIED GLAUCOMA STAGE: ICD-10-CM

## 2024-07-11 DIAGNOSIS — E87.6 HYPOKALEMIA: ICD-10-CM

## 2024-07-11 DIAGNOSIS — R53.83 FATIGUE, UNSPECIFIED TYPE: ICD-10-CM

## 2024-07-11 DIAGNOSIS — I20.0 UNSTABLE ANGINA: ICD-10-CM

## 2024-07-11 DIAGNOSIS — I51.89 DIASTOLIC DYSFUNCTION: ICD-10-CM

## 2024-07-11 DIAGNOSIS — Z00.00 MEDICARE ANNUAL WELLNESS VISIT, SUBSEQUENT: Primary | ICD-10-CM

## 2024-07-11 NOTE — PATIENT INSTRUCTIONS
Hi Bran,     If you are due for any health screening(s) below please notify me so we can arrange them to be ordered and scheduled. Most healthy patients at your age complete them, but you are free to accept or refuse.     If you can't do it, I'll definitely understand. If you can, I'd certainly appreciate it!    All of your core healthy metrics are met.        5-10 year preventative plan discussed

## 2024-07-11 NOTE — PROGRESS NOTES
Renuka Adams MD   [id number] [id number]     Patient ID: [id number]     Chief Complaint: No chief complaint on file.        HPI:     Marquis Quintero is a 83 y.o. male here today for a follow up. No other complaints today.       Subjective:     ROS    Past Medical History:   Diagnosis Date    Aortic regurgitation     Carotid artery stenosis     HTN (hypertension)     Internal hemorrhage     Mitral regurgitation     NSTEMI (non-ST elevated myocardial infarction) 02/07/2023    Admission [facility]    Personal history of colonic polyps     Skin cancer     Unspecified glaucoma         Past Surgical History:   Procedure Laterality Date    COLONOSCOPY      CYSTOSCOPY      EXTRACTION OF CATARACT      PROSTATE BIOPSY         Family History   Problem Relation Name Age of Onset    Colon cancer Mother      Heart disease Mother      Alzheimer's disease Father          Social History     Socioeconomic History    Marital status:    Tobacco Use    Smoking status: Never    Smokeless tobacco: Never   Substance and Sexual Activity    Alcohol use: Never    Drug use: Never    Sexual activity: Not Currently     Social Determinants of Health     Financial Resource Strain: Low Risk  (4/19/2023)    Overall Financial Resource Strain (CARDIA)     Difficulty of Paying Living Expenses: Not hard at all   Food Insecurity: No Food Insecurity (4/19/2023)    Hunger Vital Sign     Worried About Running Out of Food in the Last Year: Never true     Ran Out of Food in the Last Year: Never true   Transportation Needs: No Transportation Needs (4/19/2023)    PRAPARE - Transportation     Lack of Transportation (Medical): No     Lack of Transportation (Non-Medical): No   Physical Activity: Insufficiently Active (4/19/2023)    Exercise Vital Sign     Days of Exercise per Week: 2 days     Minutes of Exercise per Session: 30 min   Stress: No Stress Concern Present (4/19/2023)    [place] [employer] - Occupational Stress  Questionnaire     Feeling of Stress : Only a little   Housing Stability: Low Risk  (4/19/2023)    Housing Stability Vital Sign     Unable to Pay for Housing in the Last Year: No     Number of Places Lived in the Last Year: 1     Unstable Housing in the Last Year: No       Review of patient's allergies indicates:  No Known Allergies    Outpatient Medications Marked as Taking for the 7/11/24 encounter (Appointment) with Renuka Adams MD   Medication Sig Dispense Refill    UNABLE TO FIND Apply 1 Dose topically 3 (three) times daily. Nifedipine 0.3% ointment         Patient Care Team:  Renuka Adams MD as PCP - General (Internal Medicine)  Tristan Lozoya MD as Consulting Physician (Cardiology)  Giacomo Scott MD as Consulting Physician (Urology)  Treva Rubio MD Boudreaux, Matthew B., MD as Consulting Physician (Colon and Rectal Surgery)  Juan Carlos Nuno MD as Consulting Physician (Otolaryngology)  Jude Cho MD as Consulting Physician (Gastroenterology)  Chris Nichols MD as Consulting Physician (Orthopedic Surgery)  Renuka Adams MD as Consulting Physician (Internal Medicine)       Objective:     There were no vitals taken for this visit.    Physical Exam          Assessment/Plan:     1. Hypertensive heart disease with heart failure    2. Unstable angina    3. Renal insufficiency  Overview:  On lab 2/21/24                No follow-ups on file. In addition to their scheduled follow up, the patient has also been instructed to follow up on as needed basis.     Signature:  Renuka Adams MD  [facility]  [id number] [place] [address]

## 2024-07-11 NOTE — PROGRESS NOTES
Patient ID: [id number]     Chief Complaint: Medicare AWV      HPI:     Marquis Quintero is a 83 y.o. male here today for a Medicare Wellness. He had a fissure and saw Dr Mcclellan, he started him on a compound ointment and will see him in two weeks. He's been to Dr Cast for his glaucoma. It's getting harder, his left eye is the worst and he's having trouble judging distances now.       Opioid Screening: Patient medication list reviewed, patient is not taking prescription opioids. Patient is not using additional opioids than prescribed. Patient is at low risk of substance abuse based on this opioid use history.       Past Medical History:   Diagnosis Date    Aortic regurgitation     Carotid artery stenosis     HTN (hypertension)     Internal hemorrhage     Mitral regurgitation     NSTEMI (non-ST elevated myocardial infarction) 02/07/2023    [facility]    Personal history of colonic polyps     Skin cancer     Unspecified glaucoma         Past Surgical History:   Procedure Laterality Date    COLONOSCOPY      CYSTOSCOPY      EXTRACTION OF CATARACT      PROSTATE BIOPSY         Review of patient's allergies indicates:  No Known Allergies    Outpatient Medications Marked as Taking for the 7/11/24 encounter (Office Visit) with Renuka Adams MD   Medication Sig Dispense Refill    clotrimazole-betamethasone 1-0.05% (LOTRISONE) cream Apply 1 g topically 2 (two) times daily.      dorzolamide (TRUSOPT) 2 % ophthalmic solution Apply 1 drop to eye 3 (three) times daily.      furosemide (LASIX) 40 MG tablet Take 40 mg by mouth 2 (two) times a day.      isosorbide mononitrate (IMDUR) 30 MG 24 hr tablet Take 1 tablet by mouth every morning.      losartan (COZAAR) 100 MG tablet TAKE 1 TABLET BY MOUTH ONCE DAILY AT  6AM 90 tablet 1    metOLazone (ZAROXOLYN) 2.5 MG tablet TAKE 1 TABLET BY MOUTH ONCE DAILY IN THE MORNING 30 MINUTES PRIOR TO LASIX (FUROSEMIDE)      mupirocin (BACTROBAN) 2 % ointment Apply topically 3  (three) times daily. 30 g 1    oxybutynin (DITROPAN) 5 MG Tab Take 5 mg by mouth 2 (two) times daily. TAKING 1/2 BID      potassium chloride SA (K-DUR,KLOR-CON) 20 MEQ tablet Take 1 pill orally TID on Monday Wednesday and Friday, Take 1 Pill orally BID on Tuesday,Thursday, Saturday and Sunday 216 tablet 1    rosuvastatin (CRESTOR) 40 MG Tab Take 40 mg by mouth once daily.      tafluprost, PF, (ZIOPTAN, PF,) 0.0015 % Dpet Apply 1 drop to eye once daily at 6am.      timolol maleate 0.5% (TIMOPTIC) 0.5 % Drop Apply 1 drop to eye once daily at 6am.      UNABLE TO FIND Apply 1 Dose topically 3 (three) times daily. Nifedipine 0.3% ointment         Social History     Socioeconomic History    Marital status:    Tobacco Use    Smoking status: Never    Smokeless tobacco: Never   Substance and Sexual Activity    Alcohol use: Never    Drug use: Never    Sexual activity: Not Currently     Social Determinants of Health     Financial Resource Strain: Low Risk  (4/19/2023)    Overall Financial Resource Strain (CARDIA)     Difficulty of Paying Living Expenses: Not hard at all   Food Insecurity: No Food Insecurity (4/19/2023)    Hunger Vital Sign     Worried About Running Out of Food in the Last Year: Never true     Ran Out of Food in the Last Year: Never true   Transportation Needs: No Transportation Needs (4/19/2023)    PRAPARE - Transportation     Lack of Transportation (Medical): No     Lack of Transportation (Non-Medical): No   Physical Activity: Insufficiently Active (4/19/2023)    Exercise Vital Sign     Days of Exercise per Week: 2 days     Minutes of Exercise per Session: 30 min   Stress: No Stress Concern Present (4/19/2023)    [employer] [place] [employer] - Occupational Stress Questionnaire     Feeling of Stress : Only a little   Housing Stability: Low Risk  (4/19/2023)    Housing Stability Vital Sign     Unable to Pay for Housing in the Last Year: No     Number of Places Lived in the Last Year: 1      Unstable Housing in the Last Year: No        Family History   Problem Relation Name Age of Onset    Colon cancer Mother      Heart disease Mother      Alzheimer's disease Father          Patient Care Team:  Renuka Adams MD as PCP - General (Internal Medicine)  Tristan Lozoya MD as Consulting Physician (Cardiology)  Giacomo Scott MD as Consulting Physician (Urology)  Treva Rubio MD Boudreaux, Matthew B., MD as Consulting Physician (Colon and Rectal Surgery)  Juan Carlos Nuno MD as Consulting Physician (Otolaryngology)  Jude Cho MD as Consulting Physician (Gastroenterology)  Chris Nichols MD as Consulting Physician (Orthopedic Surgery)  Renuka Adams MD as Consulting Physician (Internal Medicine)  Wallace Espinoza MD (Dermatology)  Estelita Cast MD as Consulting Physician (Ophthalmology)       Subjective:     Review of Systems   Constitutional:         Energy is not as good as it once was   HENT:  Positive for congestion.    Eyes:         As per HPI   Respiratory:  Positive for cough.         Mostly when outside mowing, he forgets to use a mask   Cardiovascular:  Positive for leg swelling.        Dr Lozoya won't let him do a colon since he still has a blockage they can't open. Swelling was good when he was doing compression socks   Gastrointestinal:  Negative for constipation.        He strains to lift things but doesn't recall any constipation or straining when fissure happened. He had one years ago.    Musculoskeletal:  Positive for back pain and joint pain. Negative for falls.        His back and legs are bothering him now where he can't use his compression stockings, he can't put them on.    Skin:  Negative for itching and rash.   Neurological:  Negative for focal weakness and headaches.   Endo/Heme/Allergies:  Positive for environmental allergies. Bruises/bleeds easily.        Thinks it's mostly allergies that are bothering him outside, sneezes 14 times in a row at times. It  "makes him weak and he has to hold onto something when it happens.   Psychiatric/Behavioral:  The patient is nervous/anxious.         Wife's vision worse, he's trying to manage with her diabetes       Objective:     BP (!) 108/58   Pulse 78   Temp 98.2 °F (36.8 °C) (Oral)   Resp 15   Ht 5' 7" (1.702 m)   Wt 98.9 kg (218 lb)   SpO2 (!) 94%   BMI 34.14 kg/m²     Physical Exam  Vitals reviewed.   Constitutional:       Appearance: He is obese.   HENT:      Head: Normocephalic and atraumatic.      Right Ear: Tympanic membrane, ear canal and external ear normal.      Left Ear: Tympanic membrane, ear canal and external ear normal.      Ears:      Comments: Bilateral hearing aids, he's good at removing and replacing  Eyes:      Extraocular Movements: Extraocular movements intact.      Comments: Eyes with injection left >right   Neck:      Vascular: No carotid bruit.   Cardiovascular:      Rate and Rhythm: Normal rate and regular rhythm.   Pulmonary:      Effort: Pulmonary effort is normal.      Breath sounds: No wheezing or rales.   Abdominal:      Tenderness: There is no abdominal tenderness. There is no guarding.   Musculoskeletal:         General: Tenderness and deformity present.      Cervical back: No tenderness.      Right lower leg: Edema present.      Left lower leg: Edema present.      Comments: 2 to 3+ edema   Lymphadenopathy:      Cervical: No cervical adenopathy.   Skin:     General: Skin is warm and dry.      Findings: Lesion present.      Comments: Multiple solar changes on arms, face, neck and scalp, mostly keratotic, none look inflammed   Neurological:      Mental Status: He is alert and oriented to person, place, and time. Mental status is at baseline.      Cranial Nerves: Cranial nerve deficit present.      Motor: Weakness present.      Gait: Gait abnormal.   Psychiatric:         Mood and Affect: Mood normal.         Behavior: Behavior normal.         Thought Content: Thought content normal.         " Judgment: Judgment normal.      Comments: Coping with wife's continued decline           A comprehensive HEALTH RISK ASSESSMENT was completed today. Results are summarized below:    There are NO EMOTIONAL/SOCIAL CONCERNS identified on today's screening for Social Isolation, Depression and Anxiety.    There are NO COGNITIVE FUNCTION CONCERNS identified on today's screening.  The following FUNCTIONAL AND/OR SAFETY CONCERNS were identified on today's screening for Physical Symptoms, Nutritional, Home Safety/Living Situation, Fall Risk, Activities of Daily Living, Independent Activities of Daily Living, Physical Activity,Timed Up and Go test and Whisper test::  *Patient reports recent HEARING/VISION DIFFICULTIES. (Have you noticed any hearing or vision difficulties?: (!) Yes)  *Patient reports NO ROUTINE EXERCISE. (On average, how many days per week do you engage in moderate to strenuous exercise (like a brisk walk)?: (!) 0)  *Patient reports PHYSICAL ACTIVITY LIMITED BY PAIN/FATIGUE. (In the past four weeks have you your activities been limited by pain, tiredness or fatigue?: (!) Yes)   *Patient reports NO PREVENTIVE HOME HAZARD EVALUATION OR MODIFICATION. (Have you or someone else evaluated or modified your home with additional safety features like handrails on all stairs, installed grab bars in the bathroom, secured loose rugs and ensured good lighting in all areas?: (!) No)  *Patient's WHISPER TEST was ABNORMAL. (Was the patient's Whisper test normal in both ears?: (!) No)    The patient reports NO OPIOID PRESCRIPTIONS. This was confirmed through medication reconciliation and the [facility] website.    The patient is NOT A TOBACCO USER.        All Questions regarding food, transportation or housing were not answered today.    Assessment/Plan:     1. Medicare annual wellness visit, subsequent  Comments:  Trying to stay up on diet and activity. He'll look into vaccines    2. Hypertensive heart disease with heart  failure  Comments:  Edema worse but blood pressure doing great, his difficulty putting on compression stocking is likely source    3. Unstable angina  Comments:  Mostly seen as SOB, he's holding stable there  Orders:  -     TSH    4. Chronic kidney disease, stage 3a  Comments:  Appears to be worse, need lab today  Overview:  On lab 2/21/24     Orders:  -     CBC Auto Differential  -     Comprehensive Metabolic Panel  -     Microalbumin/Creatinine Ratio, Urine  -     Urinalysis    5. Aneurysm of ascending aorta without rupture  Comments:  Has follow up with [facility], he isn't operative candidate  Overview:  On scan 4/4/23 ordered by Dr Lozoya, stable on CTA 10/16/23      6. Hearing aid worn  Comments:  Canals clear    7. Primary open angle glaucoma of left eye, unspecified glaucoma stage  Comments:  Using drops, keeping follow up with Dr Cast, was to get education but hasn't heard  Overview:  Visit 8/17/23 with Dr Cast      8. Diastolic dysfunction  Overview:  Low sodium diet per Devora      9. Benign hypertension  Comments:  Continue Losartan, if dizzy may need lower dose    10. 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 Hypokalemia  Comments:  Recheck  Orders:  -     CBC Auto Differential    12. Fatigue, unspecified type  -     TSH           Medicare Annual Wellness and Personalized Prevention Plan:   Fall Risk + Home Safety + Hearing Impairment + Depression Screen + Opioid and Substance Abuse Screening + Cognitive Impairment Screen + Health Risk Assessment all reviewed.     Health Maintenance Topics with due status: Not Due       Topic Last Completion Date    Influenza Vaccine 10/04/2023    Lipid Panel 02/21/2024    Aspirin/Antiplatelet Therapy 07/11/2024      The patient's Health Maintenance was reviewed and the following appears to be due at this time:   Health Maintenance Due   Topic Date Due     TETANUS VACCINE  10/31/2012       Advance Care Planning   I attest to discussing Advance Care Planning with patient and/or family member.  Education was provided including the importance of the Health Care Power of , Advance Directives, and/or [place] documentation.  The patient expressed understanding to the importance of this information and discussion.   Advance Care Planning     Date: 07/11/2024  Patient did not wish or was not able to name a surrogate decision maker or provide an Advance Care Plan. He did take forms from [facility] and says he understands value, he'll see if his children can help him fill it out.            Follow up in about 6 months (around 1/11/2025) for HTN. In addition to their scheduled follow up, the patient has also been instructed to follow up on as needed basis.

## 2024-07-12 LAB
ALBUMIN SERPL-MCNC: 4.3 G/DL (ref 3.7–4.7)
ALBUMIN/CREAT UR: NORMAL MG/G CREAT (ref 0–29)
ALP SERPL-CCNC: 71 IU/L (ref 44–121)
ALT SERPL-CCNC: 12 IU/L (ref 0–44)
APPEARANCE UR: CLEAR
AST SERPL-CCNC: 21 IU/L (ref 0–40)
BASOPHILS # BLD AUTO: 0 X10E3/UL (ref 0–0.2)
BASOPHILS NFR BLD AUTO: 0 %
BILIRUB SERPL-MCNC: 0.5 MG/DL (ref 0–1.2)
BILIRUB UR QL STRIP: NEGATIVE
BUN SERPL-MCNC: 19 MG/DL (ref 8–27)
BUN/CREAT SERPL: 13 (ref 10–24)
CALCIUM SERPL-MCNC: 9.2 MG/DL (ref 8.6–10.2)
CHLORIDE SERPL-SCNC: 95 MMOL/L (ref 96–106)
CO2 SERPL-SCNC: 26 MMOL/L (ref 20–29)
COLOR UR: YELLOW
CREAT SERPL-MCNC: 1.45 MG/DL (ref 0.76–1.27)
CREAT UR-MCNC: 80.6 MG/DL
EOSINOPHIL # BLD AUTO: 0.1 X10E3/UL (ref 0–0.4)
EOSINOPHIL NFR BLD AUTO: 1 %
ERYTHROCYTE [DISTWIDTH] IN BLOOD BY AUTOMATED COUNT: 12.7 % (ref 11.6–15.4)
EST. GFR  (NO RACE VARIABLE): 48 ML/MIN/1.73
GLOBULIN SER CALC-MCNC: 2.3 G/DL (ref 1.5–4.5)
GLUCOSE SERPL-MCNC: 103 MG/DL (ref 70–99)
GLUCOSE UR QL STRIP: NEGATIVE
HCT VFR BLD AUTO: 45.8 % (ref 37.5–51)
HGB BLD-MCNC: 15.4 G/DL (ref 13–17.7)
HGB UR QL STRIP: NEGATIVE
IMM GRANULOCYTES NFR BLD AUTO: 0 %
KETONES UR QL STRIP: NEGATIVE
LEUKOCYTE ESTERASE UR QL STRIP: NEGATIVE
LYMPHOCYTES # BLD AUTO: 1.8 X10E3/UL (ref 0.7–3.1)
LYMPHOCYTES NFR BLD AUTO: 20 %
MCH RBC QN AUTO: 31 PG (ref 26.6–33)
MCHC RBC AUTO-ENTMCNC: 33.6 G/DL (ref 31.5–35.7)
MCV RBC AUTO: 92 FL (ref 79–97)
MICRO URNS: NORMAL
MICROALBUMIN UR-MCNC: <12 UG/ML
MONOCYTES # BLD AUTO: 0.7 X10E3/UL (ref 0.1–0.9)
MONOCYTES NFR BLD AUTO: 8 %
NEUTROPHILS # BLD AUTO: 6.3 X10E3/UL (ref 1.4–7)
NEUTROPHILS NFR BLD AUTO: 71 %
NITRITE UR QL STRIP: NEGATIVE
PH UR STRIP: 5.5 [PH] (ref 5–7.5)
PLATELET # BLD AUTO: 221 X10E3/UL (ref 150–450)
POTASSIUM SERPL-SCNC: 3.1 MMOL/L (ref 3.5–5.2)
PROT SERPL-MCNC: 6.6 G/DL (ref 6–8.5)
PROT UR QL STRIP: NEGATIVE
RBC # BLD AUTO: 4.96 X10E6/UL (ref 4.14–5.8)
SODIUM SERPL-SCNC: 139 MMOL/L (ref 134–144)
SP GR UR STRIP: 1.01 (ref 1–1.03)
TSH SERPL DL<=0.005 MIU/L-ACNC: 2.03 UIU/ML (ref 0.45–4.5)
UROBILINOGEN UR STRIP-MCNC: 0.2 MG/DL (ref 0.2–1)
WBC # BLD AUTO: 8.9 X10E3/UL (ref 3.4–10.8)

## 2024-07-14 RX ORDER — POTASSIUM CHLORIDE 20 MEQ/1
20 TABLET, EXTENDED RELEASE ORAL 3 TIMES DAILY
Qty: 270 TABLET | Refills: 1 | Status: SHIPPED | OUTPATIENT
Start: 2024-07-14

## 2024-07-15 ENCOUNTER — OFFICE VISIT (OUTPATIENT)
Dept: ORTHOPEDICS | Facility: CLINIC | Age: 84
End: 2024-07-15
Payer: MEDICARE

## 2024-07-15 ENCOUNTER — HOSPITAL ENCOUNTER (OUTPATIENT)
Dept: RADIOLOGY | Facility: CLINIC | Age: 84
Discharge: HOME | End: 2024-07-15
Attending: ORTHOPAEDIC SURGERY
Payer: MEDICARE

## 2024-07-15 VITALS
HEART RATE: 66 BPM | WEIGHT: 218.06 LBS | BODY MASS INDEX: 34.22 KG/M2 | SYSTOLIC BLOOD PRESSURE: 115 MMHG | HEIGHT: 67 IN | DIASTOLIC BLOOD PRESSURE: 71 MMHG

## 2024-07-15 DIAGNOSIS — M25.562 PAIN IN BOTH KNEES, UNSPECIFIED CHRONICITY: ICD-10-CM

## 2024-07-15 DIAGNOSIS — M25.561 PAIN IN BOTH KNEES, UNSPECIFIED CHRONICITY: ICD-10-CM

## 2024-07-15 DIAGNOSIS — M17.12 PRIMARY OSTEOARTHRITIS OF LEFT KNEE: ICD-10-CM

## 2024-07-15 DIAGNOSIS — M17.11 PRIMARY OSTEOARTHRITIS OF RIGHT KNEE: Primary | ICD-10-CM

## 2024-07-15 PROCEDURE — 20610 DRAIN/INJ JOINT/BURSA W/O US: CPT | Mod: 50,,, | Performed by: NURSE PRACTITIONER

## 2024-07-15 PROCEDURE — 73564 X-RAY EXAM KNEE 4 OR MORE: CPT | Mod: 50,,, | Performed by: ORTHOPAEDIC SURGERY

## 2024-07-15 PROCEDURE — 99204 OFFICE O/P NEW MOD 45 MIN: CPT | Mod: 25,,, | Performed by: NURSE PRACTITIONER

## 2024-07-15 RX ORDER — DEXAMETHASONE SODIUM PHOSPHATE 4 MG/ML
4 INJECTION, SOLUTION INTRA-ARTICULAR; INTRALESIONAL; INTRAMUSCULAR; INTRAVENOUS; SOFT TISSUE
Status: DISCONTINUED | OUTPATIENT
Start: 2024-07-15 | End: 2024-07-15 | Stop reason: HOSPADM

## 2024-07-15 RX ORDER — NICOTINE POLACRILEX 2 MG
GUM BUCCAL
COMMUNITY

## 2024-07-15 RX ORDER — ASPIRIN 81 MG/1
81 TABLET ORAL DAILY
COMMUNITY

## 2024-07-15 RX ORDER — DEXTROMETHORPHAN HYDROBROMIDE AND GUAIFENESIN 10; 200 MG/1; MG/1
CAPSULE, GELATIN COATED ORAL
COMMUNITY

## 2024-07-15 RX ORDER — LIDOCAINE HYDROCHLORIDE 10 MG/ML
3 INJECTION INFILTRATION; PERINEURAL
Status: DISCONTINUED | OUTPATIENT
Start: 2024-07-15 | End: 2024-07-15 | Stop reason: HOSPADM

## 2024-07-15 RX ORDER — POLYETHYLENE GLYCOL AND PROPYLENE GLYCOL 4; 3 MG/ML; MG/ML
SOLUTION/ DROPS OPHTHALMIC
COMMUNITY

## 2024-07-15 RX ORDER — CYCLOSPORINE 0.5 MG/ML
1 EMULSION OPHTHALMIC 2 TIMES DAILY
COMMUNITY

## 2024-07-15 RX ADMIN — DEXAMETHASONE SODIUM PHOSPHATE 4 MG: 4 INJECTION, SOLUTION INTRA-ARTICULAR; INTRALESIONAL; INTRAMUSCULAR; INTRAVENOUS; SOFT TISSUE at 02:07

## 2024-07-15 RX ADMIN — LIDOCAINE HYDROCHLORIDE 3 ML: 10 INJECTION INFILTRATION; PERINEURAL at 02:07

## 2024-07-15 NOTE — PROCEDURES
Large Joint Aspiration/Injection: bilateral knee    Date/Time: 7/15/2024 2:30 PM    Performed by: Maedlaine Palacio FNP  Authorized by: Madelaine Palacio FNP    Consent Done?:  Yes (Verbal)  Indications:  Arthritis and pain  Site marked: the procedure site was marked    Timeout: prior to procedure the correct patient, procedure, and site was verified    Prep: patient was prepped and draped in usual sterile fashion      Local anesthesia used?: Yes    Local anesthetic:  Topical anesthetic  Approach:  Anterolateral  Location:  Knee  Laterality:  Bilateral  Site:  Bilateral knee  Medications (Right):  3 mL LIDOcaine HCL 10 mg/ml (1%) 10 mg/mL (1 %); 4 mg dexAMETHasone 4 mg/mL  Medications (Left):  3 mL LIDOcaine HCL 10 mg/ml (1%) 10 mg/mL (1 %); 4 mg dexAMETHasone 4 mg/mL  Patient tolerance:  Patient tolerated the procedure well with no immediate complications

## 2024-07-15 NOTE — PROGRESS NOTES
Chief Complaint:   Chief Complaint   Patient presents with    Right Knee - Pain     Right knee pain, 3 years ago XR said spurs and arthritis, no prior injections/PT, been suffering quite a few years and thought it was going to get better and it hasn't, can't wear support stockings anymore due to pain, right knee hurts more, arthritis in spine/hips/knees, numbness when sit too long, BLOOD WORK LAST MONDAY WITH DR. ROMO- didn't review with him yet       Consulting Physician: Francisca Grace*    History of present illness:    he  is a pleasant 83 y.o. year old male  who presents with bilateral knee pain, right worse than left, due to known osteoarthritis. His right knee pain is medial. It is worse with increased activity. It is somewhat better at rest. He had an injection years ago with transient relief. His cardiologist advises against surgery.     Past Medical History:   Diagnosis Date    Aortic regurgitation     Carotid artery stenosis     Glaucoma     less than 1/2 vision in right eye    Hard of hearing     HTN (hypertension)     Internal hemorrhage     Mitral regurgitation     NSTEMI (non-ST elevated myocardial infarction) 02/07/2023    [facility]    Personal history of colonic polyps     Prostate cancer     Skin cancer        Past Surgical History:   Procedure Laterality Date    COLONOSCOPY      CYSTOSCOPY      EXTRACTION OF CATARACT      PROSTATE BIOPSY      triple bypass         Current Outpatient Medications   Medication Sig    ascorbic acid, vitamin C, (VITAMIN C) 100 MG tablet Take 100 mg by mouth once daily.    aspirin (ECOTRIN) 81 MG EC tablet Take 81 mg by mouth once daily.    biotin 1 mg Cap Take by mouth.    clotrimazole-betamethasone 1-0.05% (LOTRISONE) cream Apply 1 g topically 2 (two) times daily.    collagen/biotin/ascorbic acid (COLLAGEN 1500 PLUS C ORAL) Take by mouth.    cycloSPORINE (RESTASIS) 0.05 % ophthalmic emulsion 1 drop 2 (two) times daily.     dextromethorphan-guaiFENesin (CORICIDIN HBP CHEST ADONAY-COUGH)  mg Cap Take by mouth.    dorzolamide (TRUSOPT) 2 % ophthalmic solution Apply 1 drop to eye 3 (three) times daily.    furosemide (LASIX) 40 MG tablet Take 40 mg by mouth 2 (two) times a day.    isosorbide mononitrate (IMDUR) 30 MG 24 hr tablet Take 1 tablet by mouth every morning.    losartan (COZAAR) 100 MG tablet TAKE 1 TABLET BY MOUTH ONCE DAILY AT  6AM    metOLazone (ZAROXOLYN) 2.5 MG tablet TAKE 1 TABLET BY MOUTH ONCE DAILY IN THE MORNING 30 MINUTES PRIOR TO LASIX (FUROSEMIDE)    mupirocin (BACTROBAN) 2 % ointment Apply topically 3 (three) times daily.    oxybutynin (DITROPAN) 5 MG Tab Take 5 mg by mouth 2 (two) times daily. TAKING 1/2 BID    peg 400-propylene glycol, PF, (SYSTANE, PF,) 0.4-0.3 % Dpet Apply to eye.    potassium chloride SA (K-DUR,KLOR-CON) 20 MEQ tablet Take 1 tablet (20 mEq total) by mouth 3 (three) times daily.    rosuvastatin (CRESTOR) 40 MG Tab Take 40 mg by mouth once daily.    tafluprost, PF, (ZIOPTAN, PF,) 0.0015 % Dpet Apply 1 drop to eye once daily at 6am.    tamsulosin (FLOMAX) 0.4 mg Cap Take 2 capsules by mouth every evening.    timolol maleate 0.5% (TIMOPTIC) 0.5 % Drop Apply 1 drop to eye once daily at 6am.    UNABLE TO FIND Apply 1 Dose topically 3 (three) times daily. Nifedipine 0.3% ointment     No current facility-administered medications for this visit.       Review of patient's allergies indicates:  No Known Allergies    Family History   Problem Relation Name Age of Onset    Colon cancer Mother      Heart disease Mother      Alzheimer's disease Father         Social History     Socioeconomic History    Marital status:    Tobacco Use    Smoking status: Never    Smokeless tobacco: Never   Substance and Sexual Activity    Alcohol use: Never    Drug use: Never    Sexual activity: Not Currently     Social Determinants of Health     Financial Resource Strain: Low Risk  (4/19/2023)    Overall Financial  "Resource Strain (CARDIA)     Difficulty of Paying Living Expenses: Not hard at all   Food Insecurity: No Food Insecurity (4/19/2023)    Hunger Vital Sign     Worried About Running Out of Food in the Last Year: Never true     Ran Out of Food in the Last Year: Never true   Transportation Needs: No Transportation Needs (4/19/2023)    PRAPARE - Transportation     Lack of Transportation (Medical): No     Lack of Transportation (Non-Medical): No   Physical Activity: Insufficiently Active (4/19/2023)    Exercise Vital Sign     Days of Exercise per Week: 2 days     Minutes of Exercise per Session: 30 min   Stress: No Stress Concern Present (4/19/2023)    [employer] [place] [employer] - Occupational Stress Questionnaire     Feeling of Stress : Only a little   Housing Stability: Low Risk  (4/19/2023)    Housing Stability Vital Sign     Unable to Pay for Housing in the Last Year: No     Number of Places Lived in the Last Year: 1     Unstable Housing in the Last Year: No       Review of Systems:    Constitution:   Denies chills, fever, and sweats.  HENT:   Denies headaches or blurry vision.  Cardiovascular:  Denies chest pain or irregular heart beat.  Respiratory:   Denies cough or shortness of breath.  Gastrointestinal:  Denies abdominal pain, nausea, or vomiting.  Musculoskeletal:   Denies muscle cramps.  Neurological:   Denies dizziness or focal weakness.  Psychiatric/Behavior: Normal mental status.  Hematology/Lymph:  Denies bleeding problem or easy bruising/bleeding.  Skin:    Denies rash or suspicious lesions.    Examination:    Vital Signs:    Vitals:    07/15/24 1401   BP: 115/71   Pulse: 66   Weight: 98.9 kg (218 lb 0.6 oz)   Height: 5' 7" (1.702 m)       Body mass index is 34.15 kg/m².    Constitution:   Well-developed, well nourished patient in no acute distress.  Neurological:   Alert and oriented x 3 and cooperative to examination.     Psychiatric/Behavior: Normal mental status.  Respiratory:   No " shortness of breath.  Cards:    Pulses palpable and symmetric, brisk cap refill   Eyes:    Extraoccular muscles intact  Skin:    No scars, rash or suspicious lesions.    MSK:   Standing exam  stance: normal alignment, no significant leg-length discrepancy  gait: antalgic limp    Knee examination  - General comments: unremarkable appearance    - Tenderness: right medial     Knee                  RIGHT    LEFT  Skin:                  Intact      Intact  ROM:                 0-100      0-120  Effusion:              +            Neg  MJL TTP:           Neg         Neg  LJL TTP:            Neg         Neg  Miriam:          +             Neg  Pat crep:             +              +  Patella TTPs:     Neg         Neg  Patella grind:      Neg        Neg  Lachman:           Neg        Neg  Pivot shift:          Neg        Neg  Valgus stress:    Neg        Neg  Varus stress:      Neg        Neg  Posterior drawer: Neg       Neg    N-V intact intact  Hip: nml nml    Lower extremity edema:Negative       Imaging: X-rays ordered and images interpreted today personally by me of four views of each knee show osteoarthritis, right worse than left       Assessment: Primary osteoarthritis of left knee  -     X-Ray Knee Complete 4 Or More Views Bilat; Future; Expected date: 07/15/2024    Primary osteoarthritis of right knee  -     Ambulatory referral/consult to Orthopedics        Plan:  We discussed treatment options.  He would like to try bilateral intra-articular steroid injections in the knees today.  He can follow up as needed

## 2024-07-16 ENCOUNTER — TELEPHONE (OUTPATIENT)
Dept: PRIMARY CARE CLINIC | Facility: CLINIC | Age: 84
End: 2024-07-16
Payer: MEDICARE

## 2024-07-16 DIAGNOSIS — N18.31 CHRONIC KIDNEY DISEASE, STAGE 3A: Primary | ICD-10-CM

## 2024-07-16 NOTE — TELEPHONE ENCOUNTER
Result given to patient voices understanding he is ok with referral to renal ,he is only taking tylenol for pain. He forgets to take potassium tid sometimes agree to repeat lab in one  month.

## 2024-07-16 NOTE — TELEPHONE ENCOUNTER
----- Message from Renuka Adams MD sent at 7/14/2024  9:14 PM CDT -----  His kidney function is higher, keep hydrated, he looks a little more dry.  No NSAID meds like ibuprofen/naproxen. His potassium is lower. I put him up to 3 times a day every day. It was 3 days with BID the others. We need to recheck it in a month on that dose. He's a little anemic. It looks like low iron, He may want to supplement since [facility] won't let him do colon. Thyroid is ok. Urine looks ok.Copy to Dr Lozoya and Vy. Does he want to see a kidney specialist?

## 2024-07-29 ENCOUNTER — LAB REQUISITION (OUTPATIENT)
Dept: LAB | Facility: HOSPITAL | Age: 84
End: 2024-07-29
Payer: MEDICARE

## 2024-07-29 ENCOUNTER — TELEPHONE (OUTPATIENT)
Dept: PRIMARY CARE CLINIC | Facility: CLINIC | Age: 84
End: 2024-07-29
Payer: MEDICARE

## 2024-07-29 DIAGNOSIS — N18.31 CHRONIC KIDNEY DISEASE, STAGE 3A: Primary | ICD-10-CM

## 2024-07-29 DIAGNOSIS — N18.31 CHRONIC KIDNEY DISEASE (CKD) STAGE G3A/A1, MODERATELY DECREASED GLOMERULAR FILTRATION RATE (GFR) BETWEEN 45-59 ML/MIN/1.73 SQUARE METER AND ALBUMINURIA CREATININE RATIO LESS THAN 30 MG/G: Primary | ICD-10-CM

## 2024-07-29 DIAGNOSIS — N18.31 CHRONIC KIDNEY DISEASE, STAGE 3A: ICD-10-CM

## 2024-07-29 LAB
CREAT UR-MCNC: 170 MG/DL (ref 63–166)
PROT UR STRIP-MCNC: 24.9 MG/DL
URINE PROTEIN/CREATININE RATIO (OLG): 0.1

## 2024-07-29 PROCEDURE — 82570 ASSAY OF URINE CREATININE: CPT | Performed by: INTERNAL MEDICINE

## 2024-07-29 PROCEDURE — 84156 ASSAY OF PROTEIN URINE: CPT | Performed by: INTERNAL MEDICINE

## 2024-07-29 NOTE — TELEPHONE ENCOUNTER
----- Message from Irma Edgar sent at 7/29/2024  9:26 AM CDT -----  Regarding: request  Who Called: Marquis Quintero    Caller is requesting assistance/information from provider's office.    Symptoms (please be specific):      How long has patient had these symptoms:      List of preferred pharmacies on file (remove unneeded): [facility], [place] - [address]   Phone: [phone]  Fax: [phone]            Preferred Method of Contact: Phone Call  Patient's Preferred Phone Number on File: [phone]   Best Call Back Number, if different:  Additional Information: Pt would like to speak with Kianna; he states that he is almost out of insulin pens, which he could not provide the name for; also, he states that he spoke with the office of an Oncologist on today and he needs to provide a urine specimen before his appt, he wants to know if he can come over to Dr. Adams's office to provide the sample; please advise.

## 2024-08-05 ENCOUNTER — OFFICE VISIT (OUTPATIENT)
Dept: NEPHROLOGY | Facility: CLINIC | Age: 84
End: 2024-08-05
Payer: MEDICARE

## 2024-08-05 VITALS
RESPIRATION RATE: 20 BRPM | HEART RATE: 83 BPM | HEIGHT: 67 IN | WEIGHT: 220 LBS | SYSTOLIC BLOOD PRESSURE: 148 MMHG | DIASTOLIC BLOOD PRESSURE: 80 MMHG | TEMPERATURE: 98 F | BODY MASS INDEX: 34.53 KG/M2 | OXYGEN SATURATION: 93 %

## 2024-08-05 DIAGNOSIS — E66.9 CLASS 1 OBESITY WITH SERIOUS COMORBIDITY AND BODY MASS INDEX (BMI) OF 34.0 TO 34.9 IN ADULT, UNSPECIFIED OBESITY TYPE: ICD-10-CM

## 2024-08-05 DIAGNOSIS — N18.31 CHRONIC KIDNEY DISEASE, STAGE 3A: Primary | ICD-10-CM

## 2024-08-05 DIAGNOSIS — E87.6 HYPOKALEMIA: ICD-10-CM

## 2024-08-05 DIAGNOSIS — I10 BENIGN HYPERTENSION: ICD-10-CM

## 2024-08-05 PROBLEM — E66.811 CLASS 1 OBESITY WITH SERIOUS COMORBIDITY AND BODY MASS INDEX (BMI) OF 34.0 TO 34.9 IN ADULT: Status: ACTIVE | Noted: 2024-08-05

## 2024-08-05 PROCEDURE — 99215 OFFICE O/P EST HI 40 MIN: CPT | Mod: PBBFAC

## 2024-08-05 PROCEDURE — 99203 OFFICE O/P NEW LOW 30 MIN: CPT | Mod: S$PBB,,,

## 2024-08-05 PROCEDURE — 99999 PR PBB SHADOW E&M-EST. PATIENT-LVL V: CPT | Mod: PBBFAC,,,

## 2024-08-13 ENCOUNTER — HOSPITAL ENCOUNTER (OUTPATIENT)
Dept: RADIOLOGY | Facility: HOSPITAL | Age: 84
Discharge: HOME | End: 2024-08-13
Payer: MEDICARE

## 2024-08-13 DIAGNOSIS — N18.31 CHRONIC KIDNEY DISEASE, STAGE 3A: ICD-10-CM

## 2024-08-13 DIAGNOSIS — E66.9 CLASS 1 OBESITY WITH SERIOUS COMORBIDITY AND BODY MASS INDEX (BMI) OF 34.0 TO 34.9 IN ADULT, UNSPECIFIED OBESITY TYPE: ICD-10-CM

## 2024-08-13 DIAGNOSIS — I10 BENIGN HYPERTENSION: ICD-10-CM

## 2024-08-13 PROCEDURE — 76770 US EXAM ABDO BACK WALL COMP: CPT | Mod: TC

## 2024-08-14 ENCOUNTER — TELEPHONE (OUTPATIENT)
Dept: NEPHROLOGY | Facility: CLINIC | Age: 84
End: 2024-08-14
Payer: MEDICARE

## 2024-08-14 NOTE — TELEPHONE ENCOUNTER
Called Dr Lozoya's office regarding medication changes due to patient does not remember, Lasix was changed to 40 mg daily and Metolazone 2.5 mg three times a week. Called patient to give instructions, patient verbalized understanding.

## 2024-09-10 ENCOUNTER — OFFICE VISIT (OUTPATIENT)
Dept: PRIMARY CARE CLINIC | Facility: CLINIC | Age: 84
End: 2024-09-10
Payer: MEDICARE

## 2024-09-10 VITALS
DIASTOLIC BLOOD PRESSURE: 70 MMHG | HEIGHT: 67 IN | TEMPERATURE: 99 F | OXYGEN SATURATION: 94 % | HEART RATE: 59 BPM | BODY MASS INDEX: 33.59 KG/M2 | WEIGHT: 214 LBS | SYSTOLIC BLOOD PRESSURE: 163 MMHG

## 2024-09-10 DIAGNOSIS — I71.21 ANEURYSM OF ASCENDING AORTA WITHOUT RUPTURE: ICD-10-CM

## 2024-09-10 DIAGNOSIS — Z23 NEED FOR INFLUENZA VACCINATION: ICD-10-CM

## 2024-09-10 DIAGNOSIS — J31.0 GUSTATORY RHINITIS: ICD-10-CM

## 2024-09-10 DIAGNOSIS — I11.0 HYPERTENSIVE HEART DISEASE WITH HEART FAILURE: ICD-10-CM

## 2024-09-10 DIAGNOSIS — Z95.0 HISTORY OF PERMANENT CARDIAC PACEMAKER PLACEMENT: ICD-10-CM

## 2024-09-10 DIAGNOSIS — M19.012 ARTHRITIS OF LEFT SHOULDER REGION: ICD-10-CM

## 2024-09-10 DIAGNOSIS — I10 UNCONTROLLED HYPERTENSION: Primary | ICD-10-CM

## 2024-09-10 PROCEDURE — 99213 OFFICE O/P EST LOW 20 MIN: CPT | Mod: ,,, | Performed by: INTERNAL MEDICINE

## 2024-09-10 NOTE — PROGRESS NOTES
Renuka Adams MD   [id number] [address]     Patient ID: [id number]     Chief Complaint: follow for hospital bed         HPI:     Marquis Quintero is a 84 y.o. male here today for a hospitial follow up. He had to put a pacemaker with Dr Lozoya. His chest pains are better. His breathing is still bad. He's not moving much since the procedure and it's been better. He had teixeira today and it was salty. He's having trouble with pain in his shoulder and it's making it really hard to get up out of bed.        Subjective:     Review of Systems   HENT:          Nose runs when he eats, sometimes really bad   Respiratory:  Positive for shortness of breath. Negative for cough.    Cardiovascular:         CP is better, he notes still SOB with exertion.    Musculoskeletal:  Positive for joint pain.        Shoulders are hurting he's struggling with the pacemaker not healed.        Past Medical History:   Diagnosis Date    Aortic regurgitation     Carotid artery stenosis     Glaucoma     less than 1/2 vision in right eye    Hard of hearing     HTN (hypertension)     Internal hemorrhage     Mitral regurgitation     NSTEMI (non-ST elevated myocardial infarction) 02/07/2023    [facility]    Personal history of colonic polyps     Prostate cancer     Skin cancer         Past Surgical History:   Procedure Laterality Date    COLONOSCOPY      CYSTOSCOPY      EXTRACTION OF CATARACT      INSERTION OF PACEMAKER  08/19/2024    PROSTATE BIOPSY      triple bypass         Family History   Problem Relation Name Age of Onset    Colon cancer Mother      Heart disease Mother      Alzheimer's disease Father          Social History     Socioeconomic History    Marital status:    Tobacco Use    Smoking status: Never     Passive exposure: Never    Smokeless tobacco: Never   Substance and Sexual Activity    Alcohol use: Never    Drug use: Never    Sexual activity: Not Currently     Social Determinants of Health     Financial  Resource Strain: Low Risk  (4/19/2023)    Overall Financial Resource Strain (CARDIA)     Difficulty of Paying Living Expenses: Not hard at all   Food Insecurity: No Food Insecurity (4/19/2023)    Hunger Vital Sign     Worried About Running Out of Food in the Last Year: Never true     Ran Out of Food in the Last Year: Never true   Transportation Needs: No Transportation Needs (4/19/2023)    PRAPARE - Transportation     Lack of Transportation (Medical): No     Lack of Transportation (Non-Medical): No   Physical Activity: Insufficiently Active (4/19/2023)    Exercise Vital Sign     Days of Exercise per Week: 2 days     Minutes of Exercise per Session: 30 min   Stress: No Stress Concern Present (4/19/2023)    [employer] - Occupational Stress Questionnaire     Feeling of Stress : Only a little   Housing Stability: Low Risk  (4/19/2023)    Housing Stability Vital Sign     Unable to Pay for Housing in the Last Year: No     Number of Places Lived in the Last Year: 1     Unstable Housing in the Last Year: No       Review of patient's allergies indicates:  No Known Allergies    Outpatient Medications Marked as Taking for the 9/10/24 encounter (Office Visit) with Renuka Adams MD   Medication Sig Dispense Refill    ascorbic acid, vitamin C, (VITAMIN C) 100 MG tablet Take 100 mg by mouth once daily.      clotrimazole-betamethasone 1-0.05% (LOTRISONE) cream Apply 1 g topically 2 (two) times daily.      cycloSPORINE (RESTASIS) 0.05 % ophthalmic emulsion 1 drop 2 (two) times daily.      dorzolamide (TRUSOPT) 2 % ophthalmic solution Apply 1 drop to eye 3 (three) times daily.      furosemide (LASIX) 40 MG tablet Take 40 mg by mouth once daily.      losartan (COZAAR) 100 MG tablet TAKE 1 TABLET BY MOUTH ONCE DAILY AT  6AM 90 tablet 1    metOLazone (ZAROXOLYN) 2.5 MG tablet 3 (three) times a week.      mupirocin (BACTROBAN) 2 % ointment Apply topically 3 (three) times daily. 30 g 1    oxybutynin  "(DITROPAN) 5 MG Tab Take 5 mg by mouth 2 (two) times daily. TAKING 1/2 BID      peg 400-propylene glycol, PF, (SYSTANE, PF,) 0.4-0.3 % Dpet Apply to eye.      potassium chloride SA (K-DUR,KLOR-CON) 20 MEQ tablet Take 1 tablet (20 mEq total) by mouth 3 (three) times daily. 270 tablet 1    rosuvastatin (CRESTOR) 40 MG Tab Take 40 mg by mouth once daily.      tafluprost, PF, (ZIOPTAN, PF,) 0.0015 % Dpet Apply 1 drop to eye once daily at 6am.      tamsulosin (FLOMAX) 0.4 mg Cap Take 2 capsules by mouth every evening.      timolol maleate 0.5% (TIMOPTIC) 0.5 % Drop Apply 1 drop to eye once daily at 6am.      UNABLE TO FIND Apply 1 Dose topically 3 (three) times daily. Nifedipine 0.3% ointment       Current Facility-Administered Medications for the 9/10/24 encounter (Office Visit) with Renuka Adams MD   Medication Dose Route Frequency Provider Last Rate Last Admin    [DISCONTINUED] influenza (adjuvanted) (Fluad) 45 mcg/0.5 mL IM vaccine (> or = 66 yo) 0.5 mL  0.5 mL Intramuscular 1 time in Clinic/HOD            Patient Care Team:  Renuka Adams MD as PCP - General (Internal Medicine)  Tristan Lozoya MD as Consulting Physician (Cardiology)  Giacomo Scott MD as Consulting Physician (Urology)  Treva Rubio MD Boudreaux, Matthew B., MD as Consulting Physician (Colon and Rectal Surgery)  Juan Carlos Nuno MD as Consulting Physician (Otolaryngology)  Jude Cho MD as Consulting Physician (Gastroenterology)  Chris Nichols MD as Consulting Physician (Orthopedic Surgery)  Renuka Adams MD as Consulting Physician (Internal Medicine)  Wallace Espinoza MD (Dermatology)  Estelita Cast MD as Consulting Physician (Ophthalmology)       Objective:     BP (!) 163/70   Pulse (!) 59   Temp 99 °F (37.2 °C) (Oral)   Ht 5' 7" (1.702 m)   Wt 97.1 kg (214 lb)   SpO2 (!) 94%   BMI 33.52 kg/m²     Physical Exam  Vitals reviewed.   Constitutional:       Appearance: He is obese.   HENT:      Ears:      " Comments: [place]  Cardiovascular:      Rate and Rhythm: Bradycardia present.      Heart sounds: Murmur heard.      Gallop present.   Pulmonary:      Breath sounds: Rales present. No wheezing.   Abdominal:      Palpations: Abdomen is soft.      Tenderness: There is no abdominal tenderness.   Neurological:      Mental Status: He is alert.   Psychiatric:         Mood and Affect: Mood normal.         Behavior: Behavior normal.         Thought Content: Thought content normal.         Judgment: Judgment normal.               Assessment/Plan:     1. Uncontrolled hypertension  Comments:  High salt this AM, will check again in a month    2. Hypertensive heart disease with heart failure  Comments:  Mild CHF, would benefit from hospital bed he can raise for breathing  Orders:  -     HOSPITAL BED FOR HOME USE    3. History of permanent cardiac pacemaker placement  Comments:  Still healing, has flared his arthritis not being able to get up from bed easily    4. Need for influenza vaccination  -     Discontinue: influenza (adjuvanted) (Fluad) 45 mcg/0.5 mL IM vaccine (> or = 64 yo) 0.5 mL    5. Arthritis of left shoulder region  -     HOSPITAL BED FOR HOME USE    6. Aneurysm of ascending aorta without rupture  Overview:  On scan 4/4/23 ordered by Dr Lozoya, stable on CTA 10/16/23    Orders:  -     HOSPITAL BED FOR HOME USE    7. Gustatory rhinitis  Comments:  Can try Azelastin     Bran requires a hospital bed due to him requiring positioning of the body in ways not feasible with an ordinary bed to alleviate pain and due to limited ability and cannot independently make changes in body position without the use of the bed.The positioning of the body cannot be sufficiently resolved by the use of pillows and wedges.          Follow up in about 4 weeks (around 10/8/2024) for uncontrolled HTN. In addition to their scheduled follow up, the patient has also been instructed to follow up on as needed basis.     Signature:  Renuka Adams  MD  [facility]  [address] [id number]

## 2024-09-12 ENCOUNTER — CLINICAL SUPPORT (OUTPATIENT)
Dept: PRIMARY CARE CLINIC | Facility: CLINIC | Age: 84
End: 2024-09-12
Payer: MEDICARE

## 2024-09-12 DIAGNOSIS — Z23 NEED FOR VACCINATION: Primary | ICD-10-CM

## 2024-09-12 PROCEDURE — 90653 IIV ADJUVANT VACCINE IM: CPT | Mod: ,,, | Performed by: INTERNAL MEDICINE

## 2024-09-12 PROCEDURE — G0008 ADMIN INFLUENZA VIRUS VAC: HCPCS | Mod: ,,, | Performed by: INTERNAL MEDICINE

## 2024-09-22 RX ORDER — LOSARTAN POTASSIUM 100 MG/1
100 TABLET ORAL
Qty: 90 TABLET | Refills: 0 | Status: SHIPPED | OUTPATIENT
Start: 2024-09-22

## 2024-09-23 ENCOUNTER — LAB VISIT (OUTPATIENT)
Dept: LAB | Facility: HOSPITAL | Age: 84
End: 2024-09-23
Attending: INTERNAL MEDICINE
Payer: MEDICARE

## 2024-09-23 DIAGNOSIS — E78.5 HYPERLIPIDEMIA, UNSPECIFIED HYPERLIPIDEMIA TYPE: ICD-10-CM

## 2024-09-23 DIAGNOSIS — I10 ESSENTIAL HYPERTENSION, MALIGNANT: Primary | ICD-10-CM

## 2024-09-23 LAB
ALBUMIN SERPL-MCNC: 4.1 G/DL (ref 3.4–4.8)
ALBUMIN/GLOB SERPL: 1.5 RATIO (ref 1.1–2)
ALP SERPL-CCNC: 63 UNIT/L (ref 40–150)
ALT SERPL-CCNC: 7 UNIT/L (ref 0–55)
ANION GAP SERPL CALC-SCNC: 9 MEQ/L
AST SERPL-CCNC: 17 UNIT/L (ref 5–34)
BILIRUB SERPL-MCNC: 0.9 MG/DL
BUN SERPL-MCNC: 12.8 MG/DL (ref 8.4–25.7)
CALCIUM SERPL-MCNC: 9.4 MG/DL (ref 8.8–10)
CHLORIDE SERPL-SCNC: 108 MMOL/L (ref 98–107)
CHOLEST SERPL-MCNC: 134 MG/DL
CHOLEST/HDLC SERPL: 4 {RATIO} (ref 0–5)
CO2 SERPL-SCNC: 26 MMOL/L (ref 23–31)
CREAT SERPL-MCNC: 0.93 MG/DL (ref 0.73–1.18)
CREAT/UREA NIT SERPL: 14
GFR SERPLBLD CREATININE-BSD FMLA CKD-EPI: >60 ML/MIN/1.73/M2
GLOBULIN SER-MCNC: 2.8 GM/DL (ref 2.4–3.5)
GLUCOSE SERPL-MCNC: 99 MG/DL (ref 82–115)
HDLC SERPL-MCNC: 34 MG/DL (ref 35–60)
LDLC SERPL CALC-MCNC: 54 MG/DL (ref 50–140)
POTASSIUM SERPL-SCNC: 3.8 MMOL/L (ref 3.5–5.1)
PROT SERPL-MCNC: 6.9 GM/DL (ref 5.8–7.6)
SODIUM SERPL-SCNC: 143 MMOL/L (ref 136–145)
TRIGL SERPL-MCNC: 230 MG/DL (ref 34–140)
VLDLC SERPL CALC-MCNC: 46 MG/DL

## 2024-09-23 PROCEDURE — 80053 COMPREHEN METABOLIC PANEL: CPT

## 2024-09-23 PROCEDURE — 80061 LIPID PANEL: CPT

## 2024-09-23 PROCEDURE — 36415 COLL VENOUS BLD VENIPUNCTURE: CPT

## 2024-10-16 ENCOUNTER — TELEPHONE (OUTPATIENT)
Dept: PRIMARY CARE CLINIC | Facility: CLINIC | Age: 84
End: 2024-10-16
Payer: MEDICARE

## 2024-10-17 ENCOUNTER — HOSPITAL ENCOUNTER (OUTPATIENT)
Dept: RADIOLOGY | Facility: HOSPITAL | Age: 84
Discharge: HOME | End: 2024-10-17
Attending: INTERNAL MEDICINE
Payer: MEDICARE

## 2024-10-17 DIAGNOSIS — I71.20 ANEURYSM, AORTA, THORACIC: ICD-10-CM

## 2024-10-21 ENCOUNTER — TELEPHONE (OUTPATIENT)
Dept: PRIMARY CARE CLINIC | Facility: CLINIC | Age: 84
End: 2024-10-21

## 2024-10-21 ENCOUNTER — OFFICE VISIT (OUTPATIENT)
Dept: PRIMARY CARE CLINIC | Facility: CLINIC | Age: 84
End: 2024-10-21
Payer: MEDICARE

## 2024-10-21 VITALS
DIASTOLIC BLOOD PRESSURE: 83 MMHG | SYSTOLIC BLOOD PRESSURE: 149 MMHG | HEART RATE: 59 BPM | TEMPERATURE: 97 F | OXYGEN SATURATION: 96 % | HEIGHT: 67 IN | BODY MASS INDEX: 34.06 KG/M2 | WEIGHT: 217 LBS | RESPIRATION RATE: 16 BRPM

## 2024-10-21 DIAGNOSIS — T14.8XXA BRUISING: ICD-10-CM

## 2024-10-21 DIAGNOSIS — M19.90 INFLAMMATORY ARTHRITIS: ICD-10-CM

## 2024-10-21 DIAGNOSIS — I10 UNCONTROLLED HYPERTENSION: Primary | ICD-10-CM

## 2024-10-21 DIAGNOSIS — R79.82 ELEVATED C-REACTIVE PROTEIN: ICD-10-CM

## 2024-10-21 DIAGNOSIS — I11.0 HYPERTENSIVE HEART DISEASE WITH HEART FAILURE: ICD-10-CM

## 2024-10-21 DIAGNOSIS — R70.0 ELEVATED SED RATE: ICD-10-CM

## 2024-10-21 LAB
ANION GAP SERPL CALC-SCNC: 9 MEQ/L
BASOPHILS # BLD AUTO: 0.02 X10(3)/MCL
BASOPHILS NFR BLD AUTO: 0.2 %
BUN SERPL-MCNC: 13.5 MG/DL (ref 8.4–25.7)
CALCIUM SERPL-MCNC: 9.6 MG/DL (ref 8.8–10)
CHLORIDE SERPL-SCNC: 103 MMOL/L (ref 98–107)
CO2 SERPL-SCNC: 27 MMOL/L (ref 23–31)
CREAT SERPL-MCNC: 1.15 MG/DL (ref 0.72–1.25)
CREAT/UREA NIT SERPL: 12
CRP SERPL-MCNC: 4 MG/L
EOSINOPHIL # BLD AUTO: 0.08 X10(3)/MCL (ref 0–0.9)
EOSINOPHIL NFR BLD AUTO: 0.9 %
ERYTHROCYTE [DISTWIDTH] IN BLOOD BY AUTOMATED COUNT: 12.7 % (ref 11.5–17)
ERYTHROCYTE [SEDIMENTATION RATE] IN BLOOD: 28 MM/HR (ref 0–15)
GFR SERPLBLD CREATININE-BSD FMLA CKD-EPI: >60 ML/MIN/1.73/M2
GLUCOSE SERPL-MCNC: 93 MG/DL (ref 82–115)
HCT VFR BLD AUTO: 45.5 % (ref 42–52)
HGB BLD-MCNC: 15.6 G/DL (ref 14–18)
IMM GRANULOCYTES # BLD AUTO: 0.01 X10(3)/MCL (ref 0–0.04)
IMM GRANULOCYTES NFR BLD AUTO: 0.1 %
LYMPHOCYTES # BLD AUTO: 1.76 X10(3)/MCL (ref 0.6–4.6)
LYMPHOCYTES NFR BLD AUTO: 20.9 %
MCH RBC QN AUTO: 30.8 PG (ref 27–31)
MCHC RBC AUTO-ENTMCNC: 34.3 G/DL (ref 33–36)
MCV RBC AUTO: 89.7 FL (ref 80–94)
MONOCYTES # BLD AUTO: 0.64 X10(3)/MCL (ref 0.1–1.3)
MONOCYTES NFR BLD AUTO: 7.6 %
NEUTROPHILS # BLD AUTO: 5.93 X10(3)/MCL (ref 2.1–9.2)
NEUTROPHILS NFR BLD AUTO: 70.3 %
PLATELET # BLD AUTO: 199 X10(3)/MCL (ref 130–400)
PMV BLD AUTO: 9.5 FL (ref 7.4–10.4)
POTASSIUM SERPL-SCNC: 4.5 MMOL/L (ref 3.5–5.1)
RBC # BLD AUTO: 5.07 X10(6)/MCL (ref 4.7–6.1)
SODIUM SERPL-SCNC: 139 MMOL/L (ref 136–145)
WBC # BLD AUTO: 8.44 X10(3)/MCL (ref 4.5–11.5)

## 2024-10-21 PROCEDURE — 36415 COLL VENOUS BLD VENIPUNCTURE: CPT | Performed by: INTERNAL MEDICINE

## 2024-10-21 PROCEDURE — 85652 RBC SED RATE AUTOMATED: CPT | Performed by: INTERNAL MEDICINE

## 2024-10-21 PROCEDURE — 99214 OFFICE O/P EST MOD 30 MIN: CPT | Mod: ,,, | Performed by: INTERNAL MEDICINE

## 2024-10-21 PROCEDURE — 80048 BASIC METABOLIC PNL TOTAL CA: CPT | Performed by: INTERNAL MEDICINE

## 2024-10-21 PROCEDURE — 85025 COMPLETE CBC W/AUTO DIFF WBC: CPT | Performed by: INTERNAL MEDICINE

## 2024-10-21 PROCEDURE — 86140 C-REACTIVE PROTEIN: CPT | Performed by: INTERNAL MEDICINE

## 2024-10-21 PROCEDURE — 36415 COLL VENOUS BLD VENIPUNCTURE: CPT | Mod: ,,, | Performed by: INTERNAL MEDICINE

## 2024-10-21 RX ORDER — AMLODIPINE BESYLATE 2.5 MG/1
2.5 TABLET ORAL DAILY
Qty: 90 TABLET | Refills: 3 | Status: SHIPPED | OUTPATIENT
Start: 2024-10-21 | End: 2025-10-21

## 2024-10-21 NOTE — PROGRESS NOTES
Renuka Adams MD   [address] [address]     Patient ID: [id number]     Chief Complaint: 4 weeks follow up for elevated blood pressure        HPI:     Marquis Quintero is a 84 y.o. male here today for a follow up of his elevated blood pressure. He saw Dr Mcclellan and has resolved his fissure. His BP is still running high. He has been eating persimmons but nothing salty. He is due with Dr Lozoya in 2 weeks. His swelling has been doing ok. No other complaints today.       Subjective:     Review of Systems   Respiratory:  Positive for cough and shortness of breath. Negative for sputum production.    Cardiovascular:  Positive for leg swelling. Negative for chest pain.   Genitourinary:  Positive for frequency.   Musculoskeletal:  Positive for joint pain and myalgias.        Hands are swelling, pain goes up along the back of the hand over the wrist on the right. It swells more than the left in the knuckles.    Endo/Heme/Allergies:         Tried to test his sugar but couldn't get a reading after 3 tries.        Past Medical History:   Diagnosis Date    Aortic regurgitation     Carotid artery stenosis     Glaucoma     less than 1/2 vision in right eye    Hard of hearing     HTN (hypertension)     Internal hemorrhage     Mitral regurgitation     NSTEMI (non-ST elevated myocardial infarction) 02/07/2023    [facility]    Personal history of colonic polyps     Prostate cancer     Skin cancer         Past Surgical History:   Procedure Laterality Date    COLONOSCOPY      CYSTOSCOPY      EXTRACTION OF CATARACT      INSERTION OF PACEMAKER  08/19/2024    PROSTATE BIOPSY      triple bypass         Family History   Problem Relation Name Age of Onset    Colon cancer Mother      Heart disease Mother      Alzheimer's disease Father          Social History     Socioeconomic History    Marital status:    Tobacco Use    Smoking status: Never     Passive exposure: Never    Smokeless tobacco: Never   Substance and  Sexual Activity    Alcohol use: Never    Drug use: Never    Sexual activity: Not Currently     Social Drivers of Health     Financial Resource Strain: Low Risk  (4/19/2023)    Overall Financial Resource Strain (CARDIA)     Difficulty of Paying Living Expenses: Not hard at all   Food Insecurity: No Food Insecurity (4/19/2023)    Hunger Vital Sign     Worried About Running Out of Food in the Last Year: Never true     Ran Out of Food in the Last Year: Never true   Transportation Needs: No Transportation Needs (4/19/2023)    PRAPARE - Transportation     Lack of Transportation (Medical): No     Lack of Transportation (Non-Medical): No   Physical Activity: Insufficiently Active (4/19/2023)    Exercise Vital Sign     Days of Exercise per Week: 2 days     Minutes of Exercise per Session: 30 min   Stress: No Stress Concern Present (4/19/2023)    [employer] - Occupational Stress Questionnaire     Feeling of Stress : Only a little   Housing Stability: Low Risk  (4/19/2023)    Housing Stability Vital Sign     Unable to Pay for Housing in the Last Year: No     Number of Places Lived in the Last Year: 1     Unstable Housing in the Last Year: No       Review of patient's allergies indicates:  No Known Allergies    Outpatient Medications Marked as Taking for the 10/21/24 encounter (Office Visit) with Renuka Adams MD   Medication Sig Dispense Refill    ascorbic acid, vitamin C, (VITAMIN C) 100 MG tablet Take 100 mg by mouth once daily.      cycloSPORINE (RESTASIS) 0.05 % ophthalmic emulsion 1 drop 2 (two) times daily.      dorzolamide (TRUSOPT) 2 % ophthalmic solution Apply 1 drop to eye 3 (three) times daily.      furosemide (LASIX) 40 MG tablet Take 40 mg by mouth once daily.      isosorbide mononitrate (IMDUR) 30 MG 24 hr tablet Take 1 tablet by mouth every morning.      losartan (COZAAR) 100 MG tablet TAKE 1 TABLET BY MOUTH ONCE DAILY AT  6AM 90 tablet 0    mupirocin (BACTROBAN) 2 % ointment  "Apply topically 3 (three) times daily. 30 g 1    oxybutynin (DITROPAN) 5 MG Tab Take 5 mg by mouth 2 (two) times daily. TAKING 1/2 BID      potassium chloride SA (K-DUR,KLOR-CON) 20 MEQ tablet Take 1 tablet (20 mEq total) by mouth 3 (three) times daily. 270 tablet 1    rosuvastatin (CRESTOR) 40 MG Tab Take 40 mg by mouth once daily.      tafluprost, PF, (ZIOPTAN, PF,) 0.0015 % Dpet Apply 1 drop to eye once daily at 6am.      tamsulosin (FLOMAX) 0.4 mg Cap Take 2 capsules by mouth every evening.      timolol maleate 0.5% (TIMOPTIC) 0.5 % Drop Apply 1 drop to eye once daily at 6am.         Patient Care Team:  Renuka Adams MD as PCP - General (Internal Medicine)  Tristan Lozoya MD as Consulting Physician (Cardiology)  Giacomo Scott MD as Consulting Physician (Urology)  Treva Rubio MD Boudreaux, Matthew B., MD as Consulting Physician (Colon and Rectal Surgery)  Juan Carlos Nuno MD as Consulting Physician (Otolaryngology)  Jude Cho MD as Consulting Physician (Gastroenterology)  Chris Nichols MD as Consulting Physician (Orthopedic Surgery)  Renuka Adams MD as Consulting Physician (Internal Medicine)  Wallace Espinoza MD (Dermatology)  Estelita Cast MD as Consulting Physician (Ophthalmology)       Objective:     BP (!) 149/83   Pulse (!) 59   Temp 97.1 °F (36.2 °C) (Oral)   Resp 16   Ht 5' 7" (1.702 m)   Wt 98.4 kg (217 lb)   SpO2 96%   BMI 33.99 kg/m²     Physical Exam  Vitals reviewed.   Constitutional:       Appearance: He is obese.   HENT:      Head: Normocephalic and atraumatic.   Cardiovascular:      Rate and Rhythm: Bradycardia present.      Heart sounds: Murmur heard.      Gallop present.   Pulmonary:      Breath sounds: Rhonchi present. No rales.   Abdominal:      Palpations: Abdomen is soft.      Tenderness: There is no abdominal tenderness.   Musculoskeletal:         General: Swelling and tenderness present.      Comments: Over right MCP he's showing effusions and " is tender. He is slightly red over the joints and warm to touch   Skin:     Findings: Bruising present.   Neurological:      Mental Status: He is alert.               Assessment/Plan:     1. Uncontrolled hypertension  Comments:  will add back low dose of amlodipine and see if he can tolerate with his edema  Orders:  -     Basic Metabolic Panel    2. Hypertensive heart disease with heart failure  Comments:  He is due with Leleux in 2 weeks.  Orders:  -     Basic Metabolic Panel    3. Inflammatory arthritis  Comments:  If sed rate is elevated again will do rheumatoid work up wtih MCP joints swelling  Orders:  -     CBC Auto Differential  -     C-Reactive Protein  -     Sedimentation rate    4. Bruising  Comments:  Not on anticoagulants at this point  Orders:  -     CBC Auto Differential    Other orders  -     amLODIPine (NORVASC) 2.5 MG tablet; Take 1 tablet (2.5 mg total) by mouth once daily.  Dispense: 90 tablet; Refill: 3             Follow up in about 6 weeks (around 12/2/2024) for uncontrolled HTN. In addition to their scheduled follow up, the patient has also been instructed to follow up on as needed basis.     Signature:  Renuka Adams MD  [facility]  [id number] [address]

## 2024-10-21 NOTE — TELEPHONE ENCOUNTER
----- Message from Renuka Adams MD sent at 10/21/2024  4:39 PM CDT -----  His blood count is good, his chemistry is fine but his inflammation markers are high, I want him to go do testing to see if the hand pain is from rheumatoid arthritis. I put the labs in.

## 2024-10-22 ENCOUNTER — LAB VISIT (OUTPATIENT)
Dept: LAB | Facility: HOSPITAL | Age: 84
End: 2024-10-22
Attending: INTERNAL MEDICINE
Payer: MEDICARE

## 2024-10-22 DIAGNOSIS — R79.82 ELEVATED C-REACTIVE PROTEIN: ICD-10-CM

## 2024-10-22 DIAGNOSIS — R70.0 ELEVATED SED RATE: ICD-10-CM

## 2024-10-22 DIAGNOSIS — M19.90 INFLAMMATORY ARTHRITIS: ICD-10-CM

## 2024-10-22 PROCEDURE — 86235 NUCLEAR ANTIGEN ANTIBODY: CPT

## 2024-10-22 PROCEDURE — 86431 RHEUMATOID FACTOR QUANT: CPT

## 2024-10-22 PROCEDURE — 86225 DNA ANTIBODY NATIVE: CPT

## 2024-10-22 PROCEDURE — 86039 ANTINUCLEAR ANTIBODIES (ANA): CPT

## 2024-10-22 PROCEDURE — 36415 COLL VENOUS BLD VENIPUNCTURE: CPT | Mod: 91

## 2024-10-23 LAB — ENA SM IGG SER-ACNC: <0.2 U

## 2024-10-25 LAB
ANA SER QL HEP2 SUBST: NORMAL
CENTROMERE QUANT (OHS): <0.4 U/ML
DSDNA AB QUANT (OHS): <0.6 IU/ML
JO-1 AB QUANT (OHS): <0.3 U/ML
RHEUMATOID FACT SERPL-ACNC: <10 IU/ML
RNP70 AB QUANT (OHS): 2.4 U/ML
SCL-70S AB QUANT (OHS): <0.6 U/ML
SSA(RO) AB QUANT (OHS): <0.4 U/ML
SSB(LA) AB QUANT (OHS): <0.4 U/ML

## 2024-10-26 LAB — MAYO GENERIC ORDERABLE RESULT: NORMAL

## 2024-10-28 DIAGNOSIS — Z86.79 PERSONAL HISTORY OF UNSPECIFIED CIRCULATORY DISEASE: Primary | ICD-10-CM

## 2024-11-11 ENCOUNTER — HOSPITAL ENCOUNTER (OUTPATIENT)
Dept: RADIOLOGY | Facility: HOSPITAL | Age: 84
Discharge: HOME | End: 2024-11-11
Attending: INTERNAL MEDICINE
Payer: MEDICARE

## 2024-11-11 DIAGNOSIS — Z86.79 PERSONAL HISTORY OF UNSPECIFIED CIRCULATORY DISEASE: ICD-10-CM

## 2024-11-11 PROCEDURE — 71275 CT ANGIOGRAPHY CHEST: CPT | Mod: TC

## 2024-11-11 PROCEDURE — 25500020 PHARM REV CODE 255: Performed by: INTERNAL MEDICINE

## 2024-11-11 RX ADMIN — IOHEXOL 75 ML: 350 INJECTION, SOLUTION INTRAVENOUS at 01:11

## 2024-11-15 ENCOUNTER — PATIENT OUTREACH (OUTPATIENT)
Facility: CLINIC | Age: 84
End: 2024-11-15
Payer: MEDICARE

## 2024-11-15 VITALS — DIASTOLIC BLOOD PRESSURE: 79 MMHG | SYSTOLIC BLOOD PRESSURE: 139 MMHG

## 2024-11-15 NOTE — LETTER
AUTHORIZATION FOR RELEASE OF   CONFIDENTIAL INFORMATION    Dear Dr Lozoya,    We are seeing Marquis Quintero, date of birth 1940, in the clinic at [facility]. Renuka Adams MD is the patient's PCP. Marquis Quintero has an outstanding lab/procedure at the time we reviewed his chart. In order to help keep his health information updated, he has authorized us to request the following medical record(s):        (  )  MAMMOGRAM                                      (  )  COLONOSCOPY      (  )  PAP SMEAR                                          (  )  OUTSIDE LAB RESULTS     (  )  DEXA SCAN                                          (  )  EYE EXAM            (  )  FOOT EXAM                                          (  )  ENTIRE RECORD     (  )  OUTSIDE IMMUNIZATIONS                 ( x last ov visit provider notes )  _______________         Please fax records to [facility], [place], Debra A, MD, [phone]    .           Patient Name: Marquis Quintero  : 1940  Patient Phone #: [phone]

## 2024-11-15 NOTE — PROGRESS NOTES
Care Coordination Encounter Details:       MyChart Portal Status:         [x]  Reviewed MyChart Portal Status offered / enrolled if applicable        Additional Notes:     MyChart Outcomes: Pt declined and declined         Updates Requested / Reviewed:        Updated Care Coordination Note, Care Everywhere, , Care Team Updated, Immunizations Reconciliation Completed or Queried: [place], and Other    updated/reviewed         Health Maintenance Screening(s) Due:      Health Maintenance Topics Overdue:      VBHM Score: 1     Uncontrolled BP    Tetanus Vaccine                  Health Maintenance Topic(s) Outreach Outcomes & Actions Taken:    Blood Pressure - Outreach Outcomes & Actions Taken  : Remote Blood Pressure Reading Captured and b/p 139/79 has f/u with PCP 12/5/2024                                           Additional Notes:             Chronic Disease Management:     Diabetes Measures        Lab Results   Component Value Date    HGBA1C 5.3 11/09/2021           [x]  Reviewed chart for active Diabetes diagnosis     []  Scheduled necessary follow up appointments if needed         Additional Notes:             Hypertension Measures        BP Readings from Last 1 Encounters:   11/15/24 139/79           [x]  Reviewed chart for active Hypertension diagnosis     []  Reviewed & documented Home BP Cuff     [x]  Documented a Remote BP if needed & applicable     []  Scheduled necessary follow up appointments with Primary Care if needed         Additional Notes:    F/U with Primary Care on 12/5/2024          Provider Team Continuity:     Last PCP Visit Date: 10/21/2024          [x]  Reviewed Primary Care Provider Visits, Annual Wellness Visit, and Future          Appointments to ensure appointments have been scheduled and/or           completed        Additional Notes:    Primary Care F/U 12/5/2024         Social Determinants of Health          [x]  Reviewed, completed, and/or updated the following  sections:                  Food Insecurity, Transportation Needs, Financial Resource Strain,                 Tobacco Use        Additional Notes:             Care Management, Digital Medicine, and/or Education Referrals    OPCM Risk Score: 23.5         Next Steps - Referral Actions: Digital Medicine Outcomes and Actions Taken: Pt Declined or Not Eligible and no referrals sent        Additional Notes:

## 2024-12-05 ENCOUNTER — OFFICE VISIT (OUTPATIENT)
Dept: PRIMARY CARE CLINIC | Facility: CLINIC | Age: 84
End: 2024-12-05
Payer: MEDICARE

## 2024-12-05 VITALS
DIASTOLIC BLOOD PRESSURE: 81 MMHG | TEMPERATURE: 97 F | HEART RATE: 63 BPM | OXYGEN SATURATION: 94 % | RESPIRATION RATE: 16 BRPM | WEIGHT: 225 LBS | SYSTOLIC BLOOD PRESSURE: 156 MMHG | BODY MASS INDEX: 35.31 KG/M2 | HEIGHT: 67 IN

## 2024-12-05 DIAGNOSIS — Z97.4 HEARING AID WORN: ICD-10-CM

## 2024-12-05 DIAGNOSIS — H61.23 EXCESSIVE CERUMEN IN BOTH EAR CANALS: ICD-10-CM

## 2024-12-05 DIAGNOSIS — I10 UNCONTROLLED HYPERTENSION: Primary | ICD-10-CM

## 2024-12-05 PROCEDURE — 99213 OFFICE O/P EST LOW 20 MIN: CPT | Mod: ,,, | Performed by: INTERNAL MEDICINE

## 2024-12-05 RX ORDER — NEOMYCIN SULFATE, POLYMYXIN B SULFATE AND HYDROCORTISONE 10; 3.5; 1 MG/ML; MG/ML; [USP'U]/ML
3 SUSPENSION/ DROPS AURICULAR (OTIC) 4 TIMES DAILY
Qty: 10 ML | Refills: 1 | Status: SHIPPED | OUTPATIENT
Start: 2024-12-05

## 2024-12-05 NOTE — PROGRESS NOTES
Renuka Adams MD   [id number] [id number]     Patient ID: [id number]     Chief Complaint: 6 Weeks follow up for HTN        HPI:     Marquis Quintero is a 84 y.o. male here today for a follow up. He had some tests from Dr Lozoya that he's waiting on results. He wanted to know what it shows if I can see.        Subjective:     Review of Systems   HENT:  Positive for hearing loss.         Ears itch and he does go at it with qtip at times.    Cardiovascular:         He had carotid US with cardiologist. He also did ECHO before last visit and it was all stable.    Musculoskeletal:  Positive for joint pain.        Knee is hurting, he is limited with moving at times. He notes that Dr Lozoya told him to worry about his heart, not his knee.        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 04/04/2023    Stable on CTA 10/16/23 with Dr Lozoya      Aortic regurgitation     Carotid artery stenosis     Chronic kidney disease, stage 3a 08/05/2024    Coronary artery disease of native artery of native heart with stable angina pectoris 05/09/2022    Diastolic dysfunction 11/17/2023    Low sodium diet per Devora      Glaucoma     less than 1/2 vision in right eye    Hard of hearing     HTN (hypertension)     Hypertensive heart disease with heart failure 01/08/2024    Internal hemorrhage     Mitral regurgitation     NSTEMI (non-ST elevated myocardial infarction) 02/07/2023    [facility]    Personal history of colonic polyps     Primary open angle glaucoma (POAG) 09/29/2023    Visit 8/17/23 with Dr Cast      Prostate cancer     Skin cancer         Past Surgical History:   Procedure Laterality Date    COLONOSCOPY      CYSTOSCOPY      EXTRACTION OF CATARACT      INSERTION OF PACEMAKER  08/19/2024    PROSTATE BIOPSY      triple bypass          Family History   Problem Relation Name Age of Onset    Colon cancer Mother      Heart disease Mother      Alzheimer's disease Father          Social History     Socioeconomic History    Marital status:    Tobacco Use    Smoking status: Never     Passive exposure: Never    Smokeless tobacco: Never   Substance and Sexual Activity    Alcohol use: Never    Drug use: Never    Sexual activity: Not Currently     Social Drivers of Health     Financial Resource Strain: Low Risk  (11/15/2024)    Overall Financial Resource Strain (CARDIA)     Difficulty of Paying Living Expenses: Not hard at all   Food Insecurity: No Food Insecurity (11/15/2024)    Hunger Vital Sign     Worried About Running Out of Food in the Last Year: Never true     Ran Out of Food in the Last Year: Never true   Transportation Needs: No Transportation Needs (11/15/2024)    TRANSPORTATION NEEDS     Transportation : No   Physical Activity: Inactive (12/5/2024)    Exercise Vital Sign     Days of Exercise per Week: 0 days     Minutes of Exercise per Session: 0 min   Stress: No Stress Concern Present (12/5/2024)    [employer] - Occupational Stress Questionnaire     Feeling of Stress : Not at all   Housing Stability: Low Risk  (12/5/2024)    Housing Stability Vital Sign     Unable to Pay for Housing in the Last Year: No     Homeless in the Last Year: No       Review of patient's allergies indicates:  No Known Allergies    Outpatient Medications Marked as Taking for the 12/5/24 encounter (Office Visit) with Renuka Adams MD   Medication Sig Dispense Refill    amLODIPine (NORVASC) 2.5 MG tablet Take 1 tablet (2.5 mg total) by mouth once daily. 90 tablet 3    ascorbic acid, vitamin C, (VITAMIN C) 100 MG tablet Take 100 mg by mouth once daily.      cycloSPORINE (RESTASIS) 0.05 % ophthalmic emulsion 1 drop 2 (two) times daily.      dorzolamide (TRUSOPT) 2 % ophthalmic solution Apply 1 drop to eye 3 (three) times daily.    "   furosemide (LASIX) 40 MG tablet Take 40 mg by mouth 2 (two) times daily.      isosorbide mononitrate (IMDUR) 30 MG 24 hr tablet Take 1 tablet by mouth every morning.      losartan (COZAAR) 100 MG tablet TAKE 1 TABLET BY MOUTH ONCE DAILY AT  6AM 90 tablet 0    mupirocin (BACTROBAN) 2 % ointment Apply topically 3 (three) times daily. 30 g 1    oxybutynin (DITROPAN) 5 MG Tab Take 5 mg by mouth 2 (two) times daily. TAKING 1/2 BID      peg 400-propylene glycol, PF, (SYSTANE, PF,) 0.4-0.3 % Dpet Apply to eye.      potassium chloride SA (K-DUR,KLOR-CON) 20 MEQ tablet Take 1 tablet (20 mEq total) by mouth 3 (three) times daily. (Patient taking differently: Take 20 mEq by mouth.) 270 tablet 1    rosuvastatin (CRESTOR) 40 MG Tab Take 40 mg by mouth once daily.      tafluprost, PF, (ZIOPTAN, PF,) 0.0015 % Dpet Apply 1 drop to eye once daily at 6am.      tamsulosin (FLOMAX) 0.4 mg Cap Take 2 capsules by mouth every evening.      timolol maleate 0.5% (TIMOPTIC) 0.5 % Drop Apply 1 drop to eye once daily at 6am.      UNABLE TO FIND Apply 1 Dose topically 3 (three) times daily. Nifedipine 0.3% ointment         Patient Care Team:  Renuka Adams MD as PCP - General (Internal Medicine)  Tristan Lozoya MD as Consulting Physician (Cardiology)  Giacomo Scott MD as Consulting Physician (Urology)  Treva Rubio MD Boudreaux, Matthew B., MD as Consulting Physician (Colon and Rectal Surgery)  Juan Carlos Nuno MD as Consulting Physician (Otolaryngology)  Jude Cho MD as Consulting Physician (Gastroenterology)  Chris Nichols MD as Consulting Physician (Orthopedic Surgery)  Renuka Adams MD as Consulting Physician (Internal Medicine)  Wallace Espinoza MD (Dermatology)  Estelita Cast MD as Consulting Physician (Ophthalmology)  Vicky Whitley LPN as Care Coordinator       Objective:     BP (!) 156/81   Pulse 63   Temp 97 °F (36.1 °C) (Oral)   Resp 16   Ht 5' 7" (1.702 m)   Wt 102.1 kg (225 lb)   SpO2 (!) " 94%   BMI 35.24 kg/m²     Physical Exam  Vitals reviewed.   HENT:      Ears:      Comments: Having more trouble hearing today, bilateral wax pushed near drum.   Cardiovascular:      Rate and Rhythm: Normal rate.      Heart sounds: Murmur heard.      No gallop.   Pulmonary:      Effort: Pulmonary effort is normal.      Breath sounds: Normal breath sounds.   Musculoskeletal:         General: Swelling and tenderness present.      Comments: R knee with swelling but fair ROM               Assessment/Plan:     1. Uncontrolled hypertension  Comments:  He's had higher salt, he's had too much orthostatic issues to risk going up on medication    2. Hearing aid worn  -     Ear wax removal    3. Excessive cerumen in both ear canals  Comments:  Will irrigate, if hearing not improved he needs to have hearing aides evaluated.  Orders:  -     Ear wax removal    Other orders  -     neomycin-polymyxin-hydrocortisone (CORTISPORIN) 3.5-10,000-1 mg/mL-unit/mL-% otic suspension; Place 3 drops into the right ear 4 (four) times daily. Do not put hearing aide in while using  Dispense: 10 mL; Refill: 1             Follow up in about 3 months (around 3/5/2025) for uncontrolled HTN. In addition to their scheduled follow up, the patient has also been instructed to follow up on as needed basis.     Signature:  Renuka Adams MD  [facility]  [id number] [id number]

## 2025-02-03 ENCOUNTER — LAB VISIT (OUTPATIENT)
Dept: LAB | Facility: HOSPITAL | Age: 85
End: 2025-02-03
Attending: INTERNAL MEDICINE
Payer: MEDICARE

## 2025-02-03 DIAGNOSIS — R00.1 SEVERE SINUS BRADYCARDIA: ICD-10-CM

## 2025-02-03 DIAGNOSIS — R06.09 DYSPNEA ON EXERTION: ICD-10-CM

## 2025-02-03 DIAGNOSIS — I10 ESSENTIAL HYPERTENSION, MALIGNANT: ICD-10-CM

## 2025-02-03 DIAGNOSIS — R42 DIZZINESS AND GIDDINESS: Primary | ICD-10-CM

## 2025-02-03 DIAGNOSIS — E78.5 HYPERLIPIDEMIA, UNSPECIFIED HYPERLIPIDEMIA TYPE: ICD-10-CM

## 2025-02-03 LAB
ALBUMIN SERPL-MCNC: 3.8 G/DL (ref 3.4–4.8)
ALBUMIN/GLOB SERPL: 1.3 RATIO (ref 1.1–2)
ALP SERPL-CCNC: 74 UNIT/L (ref 40–150)
ALT SERPL-CCNC: 11 UNIT/L (ref 0–55)
ANION GAP SERPL CALC-SCNC: 6 MEQ/L
AST SERPL-CCNC: 18 UNIT/L (ref 5–34)
BILIRUB SERPL-MCNC: 0.6 MG/DL
BUN SERPL-MCNC: 15.9 MG/DL (ref 8.4–25.7)
CALCIUM SERPL-MCNC: 9 MG/DL (ref 8.8–10)
CHLORIDE SERPL-SCNC: 108 MMOL/L (ref 98–107)
CHOLEST SERPL-MCNC: 113 MG/DL
CHOLEST/HDLC SERPL: 3 {RATIO} (ref 0–5)
CO2 SERPL-SCNC: 28 MMOL/L (ref 23–31)
CREAT SERPL-MCNC: 1 MG/DL (ref 0.72–1.25)
CREAT/UREA NIT SERPL: 16
GFR SERPLBLD CREATININE-BSD FMLA CKD-EPI: >60 ML/MIN/1.73/M2
GLOBULIN SER-MCNC: 2.9 GM/DL (ref 2.4–3.5)
GLUCOSE SERPL-MCNC: 97 MG/DL (ref 82–115)
HDLC SERPL-MCNC: 36 MG/DL (ref 35–60)
LDLC SERPL CALC-MCNC: 62 MG/DL (ref 50–140)
POTASSIUM SERPL-SCNC: 4.4 MMOL/L (ref 3.5–5.1)
PROT SERPL-MCNC: 6.7 GM/DL (ref 5.8–7.6)
SODIUM SERPL-SCNC: 142 MMOL/L (ref 136–145)
TRIGL SERPL-MCNC: 73 MG/DL (ref 34–140)
VLDLC SERPL CALC-MCNC: 15 MG/DL

## 2025-02-03 PROCEDURE — 80061 LIPID PANEL: CPT

## 2025-02-03 PROCEDURE — 36415 COLL VENOUS BLD VENIPUNCTURE: CPT

## 2025-02-03 PROCEDURE — 80053 COMPREHEN METABOLIC PANEL: CPT

## 2025-02-13 ENCOUNTER — PATIENT OUTREACH (OUTPATIENT)
Facility: CLINIC | Age: 85
End: 2025-02-13
Payer: MEDICARE

## 2025-02-13 VITALS — SYSTOLIC BLOOD PRESSURE: 120 MMHG | DIASTOLIC BLOOD PRESSURE: 70 MMHG

## 2025-02-13 NOTE — PROGRESS NOTES
2/13/2025- Chart updated/reviewed.Value based outreach done . States he is doing good his B/P is 120/70 and reminder given about PCP f/u on 3/6/2025 verbalizes understanding.Thanked me for calling

## 2025-03-05 PROBLEM — I49.5 SICK SINUS SYNDROME: Status: ACTIVE | Noted: 2025-03-05

## 2025-03-05 NOTE — PROGRESS NOTES
Renuka Adams MD   [id number] [id number]     Patient ID: [id number]     Chief Complaint: 3 Months follow up for HTN        HPI:     Marquis Quintero is a 84 y.o. male here today for a follow up of HTN, CAD, CHF. He did lab with Dr Lozoya and said it was ok.His BP is better at home, he just ate and thinks that's why it's high now. No other complaints today.       Subjective:     Review of Systems   Respiratory:          SOB is doing ok   Cardiovascular:  Positive for leg swelling. Negative for chest pain and palpitations.   Gastrointestinal: Negative.    Musculoskeletal:  Positive for joint pain.        Cardiology has said he is not safe to do surgery on knee   Psychiatric/Behavioral: Negative.         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 04/04/2023    Stable on CTA 10/16/23 with Dr Lozoya      Aortic regurgitation     Carotid artery stenosis     Chronic kidney disease, stage 3a 08/05/2024    Coronary artery disease of native artery of native heart with stable angina pectoris 05/09/2022    Diastolic dysfunction 11/17/2023    Low sodium diet per Devora      Glaucoma     less than 1/2 vision in right eye    Hard of hearing     HTN (hypertension)     Hypertensive heart disease with heart failure 01/08/2024    Internal hemorrhage     Mitral regurgitation     NSTEMI (non-ST elevated myocardial infarction) 02/07/2023    [facility]    Personal history of colonic polyps     Primary open angle glaucoma (POAG) 09/29/2023    Visit 8/17/23 with Dr Cast      Prostate cancer     Skin cancer         Past Surgical History:   Procedure Laterality Date    COLONOSCOPY      CYSTOSCOPY      EXTRACTION OF CATARACT      INSERTION OF PACEMAKER  08/19/2024    PROSTATE BIOPSY      triple bypass         Family History   Problem Relation Name  Age of Onset    Colon cancer Mother      Heart disease Mother      Alzheimer's disease Father          Social History[1]    Review of patient's allergies indicates:  No Known Allergies    Outpatient Medications Marked as Taking for the 3/6/25 encounter (Office Visit) with Renuka Adams MD   Medication Sig Dispense Refill    amLODIPine (NORVASC) 2.5 MG tablet Take 1 tablet (2.5 mg total) by mouth once daily. 90 tablet 3    ascorbic acid, vitamin C, (VITAMIN C) 100 MG tablet Take 100 mg by mouth once daily.      cycloSPORINE (RESTASIS) 0.05 % ophthalmic emulsion 1 drop 2 (two) times daily.      dorzolamide (TRUSOPT) 2 % ophthalmic solution Apply 1 drop to eye 3 (three) times daily.      furosemide (LASIX) 40 MG tablet Take 40 mg by mouth 2 (two) times daily.      isosorbide mononitrate (IMDUR) 30 MG 24 hr tablet Take 1 tablet by mouth every morning.      losartan (COZAAR) 100 MG tablet TAKE 1 TABLET BY MOUTH ONCE DAILY AT  6AM 90 tablet 0    mupirocin (BACTROBAN) 2 % ointment Apply topically 3 (three) times daily. 30 g 1    oxybutynin (DITROPAN) 5 MG Tab Take 5 mg by mouth 2 (two) times daily. TAKING 1/2 BID      peg 400-propylene glycol, PF, (SYSTANE, PF,) 0.4-0.3 % Dpet Apply to eye.      tafluprost, PF, (ZIOPTAN, PF,) 0.0015 % Dpet Apply 1 drop to eye once daily at 6am.      tamsulosin (FLOMAX) 0.4 mg Cap Take 2 capsules by mouth every evening.      timolol maleate 0.5% (TIMOPTIC) 0.5 % Drop Apply 1 drop to eye once daily at 6am.         Patient Care Team:  Renuka Adams MD as PCP - General (Internal Medicine)  Tristan Lozoya MD as Consulting Physician (Cardiology)  Giacomo Scott MD as Consulting Physician (Urology)  Treva Rubio MD Boudreaux, Matthew B., MD as Consulting Physician (Colon and Rectal Surgery)  Juan Carlos Nuno MD as Consulting Physician (Otolaryngology)  Jude Cho MD as Consulting Physician (Gastroenterology)  Chris Nichols MD as Consulting Physician (Orthopedic  "Surgery)  Renuka Adams MD as Consulting Physician (Internal Medicine)  Wallace Espinoza MD (Dermatology)  Estelita Cast MD as Consulting Physician (Ophthalmology)  Vicky Whitley LPN as Care Coordinator       Objective:     BP (!) 154/75   Pulse 70   Temp 97.6 °F (36.4 °C) (Oral)   Resp 15   Ht 5' 7" (1.702 m)   Wt 97.5 kg (215 lb)   SpO2 (!) 94%   BMI 33.67 kg/m²     Physical Exam  Vitals reviewed.   Constitutional:       Appearance: He is obese.   Cardiovascular:      Rate and Rhythm: Normal rate and regular rhythm.      Heart sounds: Murmur heard.      Gallop present.   Pulmonary:      Breath sounds: No rhonchi or rales.   Abdominal:      Palpations: Abdomen is soft.      Tenderness: There is no abdominal tenderness.   Musculoskeletal:      Right lower leg: Edema present.      Left lower leg: Edema present.      Comments: 1+ edema   Skin:     General: Skin is warm and dry.      Findings: No bruising.   Neurological:      Mental Status: He is alert.             Lab Visit on 02/03/2025   Component Date Value    Sodium 02/03/2025 142     Potassium 02/03/2025 4.4     Chloride 02/03/2025 108 (H)     CO2 02/03/2025 28     Glucose 02/03/2025 97     Blood Urea Nitrogen 02/03/2025 15.9     Creatinine 02/03/2025 1.00     Calcium 02/03/2025 9.0     Protein Total 02/03/2025 6.7     Albumin 02/03/2025 3.8     Globulin 02/03/2025 2.9     Albumin/Globulin Ratio 02/03/2025 1.3     Bilirubin Total 02/03/2025 0.6     ALP 02/03/2025 74     ALT 02/03/2025 11     AST 02/03/2025 18     eGFR 02/03/2025 >60     Anion Gap 02/03/2025 6.0     BUN/Creatinine Ratio 02/03/2025 16     Cholesterol Total 02/03/2025 113     HDL Cholesterol 02/03/2025 36     Triglyceride 02/03/2025 73     Cholesterol/HDL Ratio 02/03/2025 3     Very Low Density Lipopro* 02/03/2025 15     LDL Cholesterol 02/03/2025 62.00        Assessment/Plan:     1. Hypertensive heart disease with heart failure  Comments:  Stable, swelling is better, he's off " metoxalone, BP up watching at home and it's better, high fall risk but prefer him under 150    2. Sick sinus syndrome  Comments:  Stable with pacemaker.    3. History of prostate cancer    4. Coronary artery disease of native artery of native heart with stable angina pectoris  Comments:  Fewer complaints of angina this visit    5. Class 1 obesity due to excess calories with serious comorbidity and body mass index (BMI) of 33.0 to 33.9 in adult  Comments:  Weight  down, part was fluid and he's looking better since the stent    6. Chronic kidney disease, stage 2 (mild)  Comments:  Off the stronger diuretics his renal function has recovered.             Follow up in about 3 months (around 6/6/2025) for uncontrolled HTN. In addition to their scheduled follow up, the patient has also been instructed to follow up on as needed basis.     Signature:  Renuka Adams MD  [facility]  [id number] [id number]         [1]   Social History  Socioeconomic History    Marital status:    Tobacco Use    Smoking status: Never     Passive exposure: Never    Smokeless tobacco: Never   Substance and Sexual Activity    Alcohol use: Never    Drug use: Never    Sexual activity: Not Currently     Social Drivers of Health     Financial Resource Strain: Low Risk  (11/15/2024)    Overall Financial Resource Strain (CARDIA)     Difficulty of Paying Living Expenses: Not hard at all   Food Insecurity: No Food Insecurity (11/15/2024)    Hunger Vital Sign     Worried About Running Out of Food in the Last Year: Never true     Ran Out of Food in the Last Year: Never true   Transportation Needs: No Transportation Needs (11/15/2024)    TRANSPORTATION NEEDS     Transportation : No   Physical Activity: Inactive (12/5/2024)    Exercise Vital Sign     Days of Exercise per Week: 0 days     Minutes of Exercise per Session: 0 min   Stress: No Stress Concern Present (12/5/2024)    [employer] - Occupational  Stress Questionnaire     Feeling of Stress : Not at all   Housing Stability: Unknown (12/5/2024)    Housing Stability Vital Sign     Unable to Pay for Housing in the Last Year: No     Homeless in the Last Year: No

## 2025-03-06 ENCOUNTER — OFFICE VISIT (OUTPATIENT)
Dept: PRIMARY CARE CLINIC | Facility: CLINIC | Age: 85
End: 2025-03-06
Payer: MEDICARE

## 2025-03-06 VITALS
DIASTOLIC BLOOD PRESSURE: 75 MMHG | SYSTOLIC BLOOD PRESSURE: 154 MMHG | HEART RATE: 70 BPM | HEIGHT: 67 IN | TEMPERATURE: 98 F | BODY MASS INDEX: 33.74 KG/M2 | WEIGHT: 215 LBS | OXYGEN SATURATION: 94 % | RESPIRATION RATE: 15 BRPM

## 2025-03-06 DIAGNOSIS — I49.5 SICK SINUS SYNDROME: ICD-10-CM

## 2025-03-06 DIAGNOSIS — E66.811 CLASS 1 OBESITY DUE TO EXCESS CALORIES WITH SERIOUS COMORBIDITY AND BODY MASS INDEX (BMI) OF 33.0 TO 33.9 IN ADULT: ICD-10-CM

## 2025-03-06 DIAGNOSIS — Z85.46 HISTORY OF PROSTATE CANCER: ICD-10-CM

## 2025-03-06 DIAGNOSIS — I25.118 CORONARY ARTERY DISEASE OF NATIVE ARTERY OF NATIVE HEART WITH STABLE ANGINA PECTORIS: ICD-10-CM

## 2025-03-06 DIAGNOSIS — I11.0 HYPERTENSIVE HEART DISEASE WITH HEART FAILURE: Primary | ICD-10-CM

## 2025-03-06 DIAGNOSIS — E66.09 CLASS 1 OBESITY DUE TO EXCESS CALORIES WITH SERIOUS COMORBIDITY AND BODY MASS INDEX (BMI) OF 33.0 TO 33.9 IN ADULT: ICD-10-CM

## 2025-03-06 DIAGNOSIS — N18.2 CHRONIC KIDNEY DISEASE, STAGE 2 (MILD): ICD-10-CM

## 2025-03-06 PROBLEM — H26.9 CATARACT: Status: RESOLVED | Noted: 2022-05-09 | Resolved: 2025-03-06

## 2025-03-06 PROCEDURE — 99214 OFFICE O/P EST MOD 30 MIN: CPT | Mod: ,,, | Performed by: INTERNAL MEDICINE

## 2025-03-20 RX ORDER — LOSARTAN POTASSIUM 100 MG/1
TABLET ORAL
Qty: 90 TABLET | Refills: 1 | Status: SHIPPED | OUTPATIENT
Start: 2025-03-20

## 2025-06-03 DIAGNOSIS — I71.20 THORACIC AORTIC ANEURYSM, WITHOUT RUPTURE, UNSPECIFIED: Primary | ICD-10-CM

## 2025-07-13 PROBLEM — I20.0 UNSTABLE ANGINA: Status: RESOLVED | Noted: 2023-02-06 | Resolved: 2025-07-13

## 2025-07-13 NOTE — PATIENT INSTRUCTIONS
Hi Bran,     If you are due for any health screening(s) below please notify me so we can arrange them to be ordered and scheduled. Most healthy patients at your age complete them, but you are free to accept or refuse.     If you can't do it, I'll definitely understand. If you can, I'd certainly appreciate it!    Tests to Keep You Healthy    Last Blood Pressure <= 139/89 (3/6/2025): NO        5-10 year preventative plan discussed.

## 2025-07-13 NOTE — PROGRESS NOTES
Patient ID: [id number]     Chief Complaint: Medicare AWV and Dizziness      HPI:     Marquis Quintero is a 84 y.o. male here today for a Medicare Wellness. He saw Dr Palacios and Alexis in March. He is due soon with Devora. He's not noting any CP but he's dizzy when he stands up. He stopped the amlodipine and furosemide.       Opioid Screening: Patient medication list reviewed, patient is not taking prescription opioids. Patient is not using additional opioids than prescribed. Patient is at low risk of substance abuse based on this opioid use history.       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 04/04/2023    Stable on CTA 10/16/23 with Dr Lozoya      Aortic regurgitation     Carotid artery stenosis     Cataract 05/09/2022    Chronic kidney disease, stage 3a 08/05/2024    improved off higher diuretic to stage 2    Coronary artery disease of native artery of native heart with stable angina pectoris 05/09/2022    Diastolic dysfunction 11/17/2023    Low sodium diet per Devora      Glaucoma     less than 1/2 vision in right eye    Hard of hearing     HTN (hypertension)     Hypertensive heart disease with heart failure 01/08/2024    Internal hemorrhage     Mitral regurgitation     NSTEMI (non-ST elevated myocardial infarction) 02/07/2023    Admission [facility]    Obesity     Osteoarthritis 05/09/2022    Personal history of colonic polyps     Primary open angle glaucoma (POAG) 09/29/2023    Visit 8/17/23 with Dr Cast      Prostate cancer     Sick sinus syndrome 03/05/2025    Skin cancer     Unstable angina 02/06/2023        Past Surgical History:   Procedure Laterality Date    COLONOSCOPY      CYSTOSCOPY      EXTRACTION OF CATARACT      INSERTION OF PACEMAKER  08/19/2024    PROSTATE BIOPSY      triple bypass         Review of patient's allergies  indicates:  No Known Allergies    Outpatient Medications Marked as Taking for the 7/14/25 encounter (Office Visit) with Renuka Adams MD   Medication Sig Dispense Refill    ascorbic acid, vitamin C, (VITAMIN C) 100 MG tablet Take 100 mg by mouth once daily.      aspirin (ECOTRIN) 81 MG EC tablet Take 81 mg by mouth once.      cycloSPORINE (RESTASIS) 0.05 % ophthalmic emulsion 1 drop 2 (two) times daily.      dorzolamide (TRUSOPT) 2 % ophthalmic solution Apply 1 drop to eye 3 (three) times daily.      isosorbide mononitrate (IMDUR) 30 MG 24 hr tablet Take 1 tablet by mouth every morning.      metOLazone (ZAROXOLYN) 2.5 MG tablet Take 2.5 mg by mouth once daily.      mupirocin (BACTROBAN) 2 % ointment Apply topically 3 (three) times daily. 30 g 1    oxybutynin (DITROPAN) 5 MG Tab Take 5 mg by mouth 2 (two) times daily. TAKING 1/2 BID      peg 400-propylene glycol, PF, (SYSTANE, PF,) 0.4-0.3 % Dpet Apply to eye.      rosuvastatin (CRESTOR) 40 MG Tab Take 40 mg by mouth once daily.      tafluprost, PF, (ZIOPTAN, PF,) 0.0015 % Dpet Apply 1 drop to eye once daily at 6am.      tamsulosin (FLOMAX) 0.4 mg Cap Take 2 capsules by mouth every evening.      timolol maleate 0.5% (TIMOPTIC) 0.5 % Drop Apply 1 drop to eye once daily at 6am.      [DISCONTINUED] losartan (COZAAR) 100 MG tablet TAKE 1 TABLET BY MOUTH ONCE DAILY AT  6  AM 90 tablet 1       Social History[1]     Family History   Problem Relation Name Age of Onset    Colon cancer Mother      Heart disease Mother      Alzheimer's disease Father          Patient Care Team:  Renuka Adams MD as PCP - General (Internal Medicine)  Tristan Lozoya MD as Consulting Physician (Cardiology)  Giacomo Scott MD as Consulting Physician (Urology)  Treva Rubio MD Boudreaux, Matthew B., MD as Consulting Physician (Colon and Rectal Surgery)  Juan Carlos Nuno MD as Consulting Physician (Otolaryngology)  Jude Cho MD as Consulting Physician  "(Gastroenterology)  Chris Nichols MD as Consulting Physician (Orthopedic Surgery)  Renuka Adams MD as Consulting Physician (Internal Medicine)  Wallace Espinoza MD (Dermatology)  Estelita Cast MD as Consulting Physician (Ophthalmology)       Subjective:     Review of Systems   Constitutional:  Negative for malaise/fatigue.   HENT:          Ear canals stay dry. He had to get new parts to his hearing aids they wore out going into his ear   Eyes:  Positive for blurred vision, pain and redness.        Right eye only has about 50% vision the left is his good eye but it's been blurry as well   Respiratory:          Breathing is pretty good unless he's having to bend over.    Cardiovascular:  Negative for chest pain, palpitations and leg swelling.        Devora says don't talk about the blockage unless they are bothering him with pain. Since he stopped the diuretics he's able to sleep   Gastrointestinal: Negative.    Genitourinary:         Less nocturia off the diuretic   Musculoskeletal:  Positive for joint pain.        Knee pain   Neurological:  Positive for dizziness.        When he stands up, he did BP and it was 150/84 sugar was 102   Psychiatric/Behavioral:  Negative for depression.         Does feel bad he can't get out and do as much as he wants to.            Objective:     BP (!) 158/76   Pulse 65   Temp 97.5 °F (36.4 °C) (Oral)   Resp 16   Ht 5' 7" (1.702 m)   Wt 97.5 kg (215 lb)   SpO2 (!) 92%   BMI 33.67 kg/m²     Physical Exam  Vitals reviewed.   Constitutional:       Appearance: He is obese. He is ill-appearing.   HENT:      Head: Normocephalic and atraumatic.      Ears:      Comments: TM's ok, canals with scaling     Mouth/Throat:      Mouth: Mucous membranes are moist.      Pharynx: No oropharyngeal exudate or posterior oropharyngeal erythema.   Eyes:      Extraocular Movements: Extraocular movements intact.      Comments: Left medial conjunctiva is red   Cardiovascular:      Rate and " Rhythm: Normal rate and regular rhythm.      Heart sounds: Murmur heard.      Gallop present.   Abdominal:      General: Bowel sounds are normal.      Palpations: Abdomen is soft.      Tenderness: There is no abdominal tenderness.   Musculoskeletal:      Cervical back: Neck supple.      Right lower leg: Edema present.      Left lower leg: Edema present.   Lymphadenopathy:      Cervical: No cervical adenopathy.   Skin:     General: Skin is warm and dry.      Findings: Bruising present.   Neurological:      Mental Status: He is alert and oriented to person, place, and time. Mental status is at baseline.      Sensory: Sensory deficit present.      Gait: Gait abnormal.   Psychiatric:      Comments: Mood si slightly depressed           A comprehensive HEALTH RISK ASSESSMENT was completed today. Results are summarized below:    There are NO EMOTIONAL/SOCIAL CONCERNS identified on today's screening for Social Isolation, Depression and Anxiety.    There are NO COGNITIVE FUNCTION CONCERNS identified on today's screening.  The following FUNCTIONAL AND/OR SAFETY CONCERNS were identified on today's screening for Physical Symptoms, Nutritional, Home Safety/Living Situation, Fall Risk, Activities of Daily Living, Independent Activities of Daily Living, Physical Activity,Timed Up and Go test and Whisper test::  *Patient reports recent HEARING/VISION DIFFICULTIES. (Have you noticed any hearing or vision difficulties?: (!) Yes)  *Patient reports NO ROUTINE EXERCISE. (On average, how many days per week do you engage in moderate to strenuous exercise (like a brisk walk)?: (!) 0)  *Patient reports recently FEELING DIZZY, has a FEAR OF FALLING or HAS FALLEN. (In the past four weeks have you felt dizzy when standing up, were afraid of falling or fallen?: (!) Yes)  *Patient reports NO PREVENTIVE HOME HAZARD EVALUATION OR MODIFICATION. (Have you or someone else evaluated or modified your home with additional safety features like handrails  on all stairs, installed grab bars in the bathroom, secured loose rugs and ensured good lighting in all areas?: (!) No)  *Patient's WHISPER TEST was ABNORMAL. (Was the patient's Whisper test normal in both ears?: (!) No)    The patient reports NO OPIOID PRESCRIPTIONS. This was confirmed through medication reconciliation.    The patient is NOT A TOBACCO USER.  The patient reports NO SIGNIFICANT ALCOHOL USE.     All Questions regarding food, transportation or housing were not answered today.    Assessment/Plan:     1. Medicare annual wellness visit, subsequent    2. Benign hypertension  Comments:  Elevated off amlodipine and furosemide but swelling less, will try change from losartan to valsartan    3. Coronary artery disease of native artery of native heart with stable angina pectoris  Comments:  Continue Imdur and rosuvastatin    4. Diastolic dysfunction  Overview:  Low sodium diet per Devora      5. Hypertensive heart disease with heart failure  Comments:  Stable so far off furosemide    6. Occlusion of coronary artery stent, sequela  Comments:  Meri has him on nitrates and rosuvastatin, he didn't tolerate blood thinners due to bleeding  Overview:  Admission with angiogram at [facility] 2/8/23 shows Y graft to circumflex that had been stented is occluded. The native circumflex has a channel. Dr Jerez will decide in a week if he needs stent through native.       7. Sick sinus syndrome  Comments:  Has pacemaker    8. Chronic kidney disease, stage 2 (mild)  Comments:  Improved off furosemide    9. Benign prostatic hyperplasia with nocturia  Comments:  Improved symptoms off furosemide    10. History of prostate cancer    11. Class 1 obesity due to excess calories with serious comorbidity and body mass index (BMI) of 34.0 to 34.9 in adult    Other orders  -     valsartan (DIOVAN) 160 MG tablet; Take 1 tablet (160 mg total) by mouth once daily.  Dispense: 90 tablet; Refill: 3           Medicare Annual Wellness and  Personalized Prevention Plan:   Fall Risk + Home Safety + Hearing Impairment + Depression Screen + Opioid and Substance Abuse Screening + Cognitive Impairment Screen + Health Risk Assessment all reviewed.     Health Maintenance Topics with due status: Not Due       Topic Last Completion Date    Influenza Vaccine 09/12/2024    Lipid Panel 02/03/2025    TETANUS VACCINE 05/13/2025      The patient's Health Maintenance was reviewed and the following appears to be due at this time:   Health Maintenance Due   Topic Date Due    Shingles Vaccine (3 of 3) 07/08/2025       Advance Care Planning   I attest to discussing Advance Care Planning with patient and/or family member.  Education was provided including the importance of the Health Care Power of , Advance Directives, and/or [place] documentation.  The patient expressed understanding to the importance of this information and discussion.     Advance Care Planning     Date: 07/14/2025  Patient did not wish or was not able to name a surrogate decision maker or provide an Advance Care Plan. Children know what he would want.          Follow up in about 3 months (around 10/14/2025) for uncontrolled HTN. In addition to their scheduled follow up, the patient has also been instructed to follow up on as needed basis.            [1]   Social History  Socioeconomic History    Marital status:    Tobacco Use    Smoking status: Never     Passive exposure: Never    Smokeless tobacco: Never   Substance and Sexual Activity    Alcohol use: Never    Drug use: Never    Sexual activity: Not Currently     Social Drivers of Health     Financial Resource Strain: Low Risk  (7/14/2025)    Overall Financial Resource Strain (CARDIA)     Difficulty of Paying Living Expenses: Not hard at all   Food Insecurity: No Food Insecurity (7/14/2025)    Hunger Vital Sign     Worried About Running Out of Food in the Last Year: Never true     Ran Out of Food in the Last Year: Never true    Transportation Needs: No Transportation Needs (7/14/2025)    PRAPARE - Transportation     Lack of Transportation (Medical): No     Lack of Transportation (Non-Medical): No   Physical Activity: Inactive (7/14/2025)    Exercise Vital Sign     Days of Exercise per Week: 0 days     Minutes of Exercise per Session: 0 min   Stress: Stress Concern Present (7/14/2025)    [employer] - Occupational Stress Questionnaire     Feeling of Stress : To some extent   Housing Stability: Low Risk  (7/14/2025)    Housing Stability Vital Sign     Unable to Pay for Housing in the Last Year: No     Number of Times Moved in the Last Year: 0     Homeless in the Last Year: No

## 2025-07-14 ENCOUNTER — OFFICE VISIT (OUTPATIENT)
Dept: PRIMARY CARE CLINIC | Facility: CLINIC | Age: 85
End: 2025-07-14
Payer: MEDICARE

## 2025-07-14 VITALS
DIASTOLIC BLOOD PRESSURE: 76 MMHG | RESPIRATION RATE: 16 BRPM | OXYGEN SATURATION: 92 % | BODY MASS INDEX: 33.74 KG/M2 | HEART RATE: 65 BPM | WEIGHT: 215 LBS | SYSTOLIC BLOOD PRESSURE: 158 MMHG | TEMPERATURE: 98 F | HEIGHT: 67 IN

## 2025-07-14 DIAGNOSIS — N40.1 BENIGN PROSTATIC HYPERPLASIA WITH NOCTURIA: ICD-10-CM

## 2025-07-14 DIAGNOSIS — T82.897S: ICD-10-CM

## 2025-07-14 DIAGNOSIS — E66.811 CLASS 1 OBESITY DUE TO EXCESS CALORIES WITH SERIOUS COMORBIDITY AND BODY MASS INDEX (BMI) OF 34.0 TO 34.9 IN ADULT: ICD-10-CM

## 2025-07-14 DIAGNOSIS — I25.118 CORONARY ARTERY DISEASE OF NATIVE ARTERY OF NATIVE HEART WITH STABLE ANGINA PECTORIS: ICD-10-CM

## 2025-07-14 DIAGNOSIS — E66.09 CLASS 1 OBESITY DUE TO EXCESS CALORIES WITH SERIOUS COMORBIDITY AND BODY MASS INDEX (BMI) OF 34.0 TO 34.9 IN ADULT: ICD-10-CM

## 2025-07-14 DIAGNOSIS — I11.0 HYPERTENSIVE HEART DISEASE WITH HEART FAILURE: ICD-10-CM

## 2025-07-14 DIAGNOSIS — R35.1 BENIGN PROSTATIC HYPERPLASIA WITH NOCTURIA: ICD-10-CM

## 2025-07-14 DIAGNOSIS — I10 BENIGN HYPERTENSION: ICD-10-CM

## 2025-07-14 DIAGNOSIS — I51.89 DIASTOLIC DYSFUNCTION: ICD-10-CM

## 2025-07-14 DIAGNOSIS — I49.5 SICK SINUS SYNDROME: ICD-10-CM

## 2025-07-14 DIAGNOSIS — Z00.00 MEDICARE ANNUAL WELLNESS VISIT, SUBSEQUENT: Primary | ICD-10-CM

## 2025-07-14 DIAGNOSIS — Z85.46 HISTORY OF PROSTATE CANCER: ICD-10-CM

## 2025-07-14 DIAGNOSIS — N18.2 CHRONIC KIDNEY DISEASE, STAGE 2 (MILD): ICD-10-CM

## 2025-07-14 PROBLEM — N28.9 RENAL INSUFFICIENCY: Status: RESOLVED | Noted: 2024-02-21 | Resolved: 2025-07-14

## 2025-07-14 PROCEDURE — G0439 PPPS, SUBSEQ VISIT: HCPCS | Mod: ,,, | Performed by: INTERNAL MEDICINE

## 2025-07-14 RX ORDER — VALSARTAN 160 MG/1
160 TABLET ORAL DAILY
Qty: 90 TABLET | Refills: 3 | Status: SHIPPED | OUTPATIENT
Start: 2025-07-14 | End: 2026-07-14

## 2025-07-14 RX ORDER — METOLAZONE 2.5 MG/1
2.5 TABLET ORAL DAILY
COMMUNITY
Start: 2024-10-01

## 2025-07-14 RX ORDER — ASPIRIN 81 MG/1
81 TABLET ORAL ONCE
COMMUNITY